# Patient Record
Sex: FEMALE | Race: WHITE | Employment: FULL TIME | URBAN - METROPOLITAN AREA
[De-identification: names, ages, dates, MRNs, and addresses within clinical notes are randomized per-mention and may not be internally consistent; named-entity substitution may affect disease eponyms.]

---

## 2017-11-01 PROBLEM — Z86.32 HISTORY OF GESTATIONAL DIABETES IN PRIOR PREGNANCY, CURRENTLY PREGNANT, FIRST TRIMESTER: Status: ACTIVE | Noted: 2017-11-01

## 2017-11-01 PROBLEM — O09.291 HISTORY OF GESTATIONAL DIABETES IN PRIOR PREGNANCY, CURRENTLY PREGNANT, FIRST TRIMESTER: Status: ACTIVE | Noted: 2017-11-01

## 2017-11-29 PROBLEM — O34.219 PREVIOUS CESAREAN DELIVERY AFFECTING PREGNANCY: Status: ACTIVE | Noted: 2017-11-29

## 2017-12-21 PROBLEM — O35.2XX0 HEREDITARY DISEASE IN FAMILY POSSIBLY AFFECTING FETUS, AFFECTING MANAGEMENT OF MOTHER, ANTEPARTUM CONDITION OR COMPLICATION: Status: ACTIVE | Noted: 2017-12-21

## 2017-12-21 PROBLEM — O09.92 HRP (HIGH RISK PREGNANCY), SECOND TRIMESTER: Status: ACTIVE | Noted: 2017-12-21

## 2017-12-21 PROBLEM — O24.414 INSULIN CONTROLLED GESTATIONAL DIABETES MELLITUS IN SECOND TRIMESTER: Status: ACTIVE | Noted: 2017-12-21

## 2017-12-21 PROBLEM — O09.292 HISTORY OF GESTATIONAL DIABETES IN PRIOR PREGNANCY, CURRENTLY PREGNANT, SECOND TRIMESTER: Status: ACTIVE | Noted: 2017-11-01

## 2017-12-21 PROBLEM — O35.BXX0 ECHOGENIC FOCUS OF HEART OF FETUS AFFECTING ANTEPARTUM CARE OF MOTHER: Status: ACTIVE | Noted: 2017-12-21

## 2018-01-16 PROBLEM — O36.8390 FETAL ARRHYTHMIA AFFECTING PREGNANCY, ANTEPARTUM: Status: ACTIVE | Noted: 2018-01-16

## 2018-01-25 ENCOUNTER — HOSPITAL ENCOUNTER (EMERGENCY)
Age: 33
Discharge: HOME | End: 2018-01-25
Attending: OBSTETRICS & GYNECOLOGY | Admitting: OBSTETRICS & GYNECOLOGY
Payer: COMMERCIAL

## 2018-01-25 VITALS
DIASTOLIC BLOOD PRESSURE: 58 MMHG | HEART RATE: 101 BPM | SYSTOLIC BLOOD PRESSURE: 105 MMHG | OXYGEN SATURATION: 100 % | RESPIRATION RATE: 16 BRPM | BODY MASS INDEX: 23.55 KG/M2 | WEIGHT: 128 LBS | TEMPERATURE: 99.1 F | HEIGHT: 62 IN

## 2018-01-25 PROBLEM — R05.9 COUGH: Status: ACTIVE | Noted: 2018-01-25

## 2018-01-25 LAB
ALBUMIN SERPL-MCNC: 2.4 G/DL (ref 3.5–5)
ALBUMIN/GLOB SERPL: 0.6 {RATIO} (ref 1.2–3.5)
ALP SERPL-CCNC: 79 U/L (ref 50–136)
ALT SERPL-CCNC: 10 U/L (ref 12–65)
ANION GAP SERPL CALC-SCNC: 10 MMOL/L (ref 7–16)
AST SERPL-CCNC: 14 U/L (ref 15–37)
ATRIAL RATE: 105 BPM
BASOPHILS # BLD: 0 K/UL (ref 0–0.2)
BASOPHILS NFR BLD: 0 % (ref 0–2)
BILIRUB SERPL-MCNC: 0.3 MG/DL (ref 0.2–1.1)
BUN SERPL-MCNC: 5 MG/DL (ref 6–23)
CALCIUM SERPL-MCNC: 10.2 MG/DL (ref 8.3–10.4)
CALCULATED P AXIS, ECG09: 31 DEGREES
CALCULATED R AXIS, ECG10: 63 DEGREES
CALCULATED T AXIS, ECG11: 36 DEGREES
CHLORIDE SERPL-SCNC: 102 MMOL/L (ref 98–107)
CO2 SERPL-SCNC: 27 MMOL/L (ref 21–32)
CREAT SERPL-MCNC: 0.84 MG/DL (ref 0.6–1)
DIAGNOSIS, 93000: NORMAL
DIFFERENTIAL METHOD BLD: ABNORMAL
EOSINOPHIL # BLD: 0 K/UL (ref 0–0.8)
EOSINOPHIL NFR BLD: 0 % (ref 0.5–7.8)
ERYTHROCYTE [DISTWIDTH] IN BLOOD BY AUTOMATED COUNT: 13.7 % (ref 11.9–14.6)
GLOBULIN SER CALC-MCNC: 3.8 G/DL (ref 2.3–3.5)
GLUCOSE BLD STRIP.AUTO-MCNC: 77 MG/DL (ref 65–100)
GLUCOSE SERPL-MCNC: 96 MG/DL (ref 65–100)
HCT VFR BLD AUTO: 31.1 % (ref 35.8–46.3)
HGB BLD-MCNC: 9.8 G/DL (ref 11.7–15.4)
IMM GRANULOCYTES # BLD: 0 K/UL (ref 0–0.5)
IMM GRANULOCYTES NFR BLD AUTO: 1 % (ref 0–5)
LYMPHOCYTES # BLD: 0.6 K/UL (ref 0.5–4.6)
LYMPHOCYTES NFR BLD: 10 % (ref 13–44)
MCH RBC QN AUTO: 28.3 PG (ref 26.1–32.9)
MCHC RBC AUTO-ENTMCNC: 31.5 G/DL (ref 31.4–35)
MCV RBC AUTO: 89.9 FL (ref 79.6–97.8)
MONOCYTES # BLD: 0.8 K/UL (ref 0.1–1.3)
MONOCYTES NFR BLD: 14 % (ref 4–12)
NEUTS SEG # BLD: 4.1 K/UL (ref 1.7–8.2)
NEUTS SEG NFR BLD: 75 % (ref 43–78)
P-R INTERVAL, ECG05: 114 MS
PLATELET # BLD AUTO: 135 K/UL (ref 150–450)
PMV BLD AUTO: 9.3 FL (ref 10.8–14.1)
POTASSIUM SERPL-SCNC: 3.4 MMOL/L (ref 3.5–5.1)
PROT SERPL-MCNC: 6.2 G/DL (ref 6.3–8.2)
Q-T INTERVAL, ECG07: 324 MS
QRS DURATION, ECG06: 84 MS
QTC CALCULATION (BEZET), ECG08: 428 MS
RBC # BLD AUTO: 3.46 M/UL (ref 4.05–5.25)
SODIUM SERPL-SCNC: 139 MMOL/L (ref 136–145)
VENTRICULAR RATE, ECG03: 105 BPM
WBC # BLD AUTO: 5.5 K/UL (ref 4.3–11.1)

## 2018-01-25 PROCEDURE — 74011250636 HC RX REV CODE- 250/636: Performed by: OBSTETRICS & GYNECOLOGY

## 2018-01-25 PROCEDURE — 93005 ELECTROCARDIOGRAM TRACING: CPT | Performed by: OBSTETRICS & GYNECOLOGY

## 2018-01-25 PROCEDURE — 85025 COMPLETE CBC W/AUTO DIFF WBC: CPT | Performed by: OBSTETRICS & GYNECOLOGY

## 2018-01-25 PROCEDURE — 99285 EMERGENCY DEPT VISIT HI MDM: CPT | Performed by: OBSTETRICS & GYNECOLOGY

## 2018-01-25 PROCEDURE — 96360 HYDRATION IV INFUSION INIT: CPT

## 2018-01-25 PROCEDURE — 80053 COMPREHEN METABOLIC PANEL: CPT | Performed by: OBSTETRICS & GYNECOLOGY

## 2018-01-25 PROCEDURE — 96360 HYDRATION IV INFUSION INIT: CPT | Performed by: OBSTETRICS & GYNECOLOGY

## 2018-01-25 PROCEDURE — 82962 GLUCOSE BLOOD TEST: CPT

## 2018-01-25 PROCEDURE — 99285 EMERGENCY DEPT VISIT HI MDM: CPT

## 2018-01-25 PROCEDURE — 74011250637 HC RX REV CODE- 250/637: Performed by: OBSTETRICS & GYNECOLOGY

## 2018-01-25 RX ORDER — ONDANSETRON 8 MG/1
8 TABLET, ORALLY DISINTEGRATING ORAL
Qty: 12 TAB | Refills: 1 | Status: SHIPPED | OUTPATIENT
Start: 2018-01-25 | End: 2018-02-06

## 2018-01-25 RX ORDER — BENZONATATE 100 MG/1
100 CAPSULE ORAL
Qty: 15 CAP | Refills: 0 | Status: SHIPPED | OUTPATIENT
Start: 2018-01-25 | End: 2018-02-01

## 2018-01-25 RX ORDER — BENZONATATE 100 MG/1
100 CAPSULE ORAL
Status: COMPLETED | OUTPATIENT
Start: 2018-01-25 | End: 2018-01-25

## 2018-01-25 RX ORDER — ONDANSETRON 4 MG/1
8 TABLET, ORALLY DISINTEGRATING ORAL
Status: COMPLETED | OUTPATIENT
Start: 2018-01-25 | End: 2018-01-25

## 2018-01-25 RX ORDER — FAMOTIDINE 20 MG/1
20 TABLET, FILM COATED ORAL
Status: COMPLETED | OUTPATIENT
Start: 2018-01-25 | End: 2018-01-25

## 2018-01-25 RX ORDER — OSELTAMIVIR PHOSPHATE 75 MG/1
75 CAPSULE ORAL
COMMUNITY
End: 2018-02-06 | Stop reason: ALTCHOICE

## 2018-01-25 RX ADMIN — SODIUM CHLORIDE, SODIUM LACTATE, POTASSIUM CHLORIDE, AND CALCIUM CHLORIDE 1000 ML: 600; 310; 30; 20 INJECTION, SOLUTION INTRAVENOUS at 14:45

## 2018-01-25 RX ADMIN — FAMOTIDINE 20 MG: 20 TABLET ORAL at 14:25

## 2018-01-25 RX ADMIN — ONDANSETRON 8 MG: 4 TABLET, ORALLY DISINTEGRATING ORAL at 14:25

## 2018-01-25 RX ADMIN — BENZONATATE 100 MG: 100 CAPSULE ORAL at 16:46

## 2018-01-25 NOTE — DISCHARGE INSTRUCTIONS
Pregnancy Precautions: Care Instructions  Your Care Instructions    There is no sure way to prevent labor before your due date ( labor) or to prevent most other pregnancy problems. But there are things you can do to increase your chances of a healthy pregnancy. Go to your appointments, follow your doctor's advice, and take good care of yourself. Eat well, and exercise (if your doctor agrees). And make sure to drink plenty of water. Follow-up care is a key part of your treatment and safety. Be sure to make and go to all appointments, and call your doctor if you are having problems. It's also a good idea to know your test results and keep a list of the medicines you take. How can you care for yourself at home? · Make sure you go to your prenatal appointments. At each visit, your doctor will check your blood pressure. Your doctor will also check to see if you have protein in your urine. High blood pressure and protein in urine are signs of preeclampsia. This condition can be dangerous for you and your baby. · Drink plenty of fluids, enough so that your urine is light yellow or clear like water. Dehydration can cause contractions. If you have kidney, heart, or liver disease and have to limit fluids, talk with your doctor before you increase the amount of fluids you drink. · Tell your doctor right away if you notice any symptoms of an infection, such as:  ¨ Burning when you urinate. ¨ A foul-smelling discharge from your vagina. ¨ Vaginal itching. ¨ Unexplained fever. ¨ Unusual pain or soreness in your uterus or lower belly. · Eat a balanced diet. Include plenty of foods that are high in calcium and iron. ¨ Foods high in calcium include milk, cheese, yogurt, almonds, and broccoli. ¨ Foods high in iron include red meat, shellfish, poultry, eggs, beans, raisins, whole-grain bread, and leafy green vegetables. · Do not smoke.  If you need help quitting, talk to your doctor about stop-smoking programs and medicines. These can increase your chances of quitting for good. · Do not drink alcohol or use illegal drugs. · Follow your doctor's directions about activity. Your doctor will let you know how much, if any, exercise you can do. · Ask your doctor if you can have sex. If you are at risk for early labor, your doctor may ask you to not have sex. · Take care to prevent falls. During pregnancy, your joints are loose, and your balance is off. Sports such as bicycling, skiing, or in-line skating can increase your risk of falling. And don't ride horses or motorcycles, dive, water ski, scuba dive, or parachute jump while you are pregnant. · Avoid getting very hot. Do not use saunas or hot tubs. Avoid staying out in the sun in hot weather for long periods. Take acetaminophen (Tylenol) to lower a high fever. · Do not take any over-the-counter or herbal medicines or supplements without talking to your doctor or pharmacist first.  When should you call for help? Call 911 anytime you think you may need emergency care. For example, call if:  ? · You passed out (lost consciousness). ? · You have severe vaginal bleeding. ? · You have severe pain in your belly or pelvis. ? · You have had fluid gushing or leaking from your vagina and you know or think the umbilical cord is bulging into your vagina. If this happens, immediately get down on your knees so your rear end (buttocks) is higher than your head. This will decrease the pressure on the cord until help arrives. ?Call your doctor now or seek immediate medical care if:  ? · You have signs of preeclampsia, such as:  ¨ Sudden swelling of your face, hands, or feet. ¨ New vision problems (such as dimness or blurring). ¨ A severe headache. ? · You have any vaginal bleeding. ? · You have belly pain or cramping. ? · You have a fever. ? · You have had regular contractions (with or without pain) for an hour.  This means that you have 8 or more within 1 hour or 4 or more in 20 minutes after you change your position and drink fluids. ? · You have a sudden release of fluid from your vagina. ? · You have low back pain or pelvic pressure that does not go away. ? · You notice that your baby has stopped moving or is moving much less than normal.   ? Watch closely for changes in your health, and be sure to contact your doctor if you have any problems. Where can you learn more? Go to http://alberto-dasha.info/. Enter 8937-1933990 in the search box to learn more about \"Pregnancy Precautions: Care Instructions. \"  Current as of: March 16, 2017  Content Version: 11.4  © 4118-4313 [employer]. Care instructions adapted under license by [employer] (which disclaims liability or warranty for this information). If you have questions about a medical condition or this instruction, always ask your healthcare professional. [address] any warranty or liability for your use of this information.

## 2018-01-25 NOTE — PROGRESS NOTES
Pt presented with c/o having the flu and now having contractions and back pain. Pt states she feels awful like she is going to pass out . Pt states she has been taking her medication since Monday and symptoms are worsening. EFM applied after explanation and verbal consent obtained.

## 2018-01-25 NOTE — IP AVS SNAPSHOT
[address] 
[phone] Patient: Kristi Saldivar 
MRN: [id number] About your hospitalization You were admitted on:  N/A You last received care in the:  [facility] You were discharged on:  January 25, 2018 Why you were hospitalized Your primary diagnosis was:  Not on File Your diagnoses also included:  Cough Follow-up Information None Your Scheduled Appointments Wednesday January 31, 2018 11:15 AM EST Return OB with Tito Griffin MD  
[facility] ([facility]) [address] [place] [id number]  
[phone] Tuesday February 06, 2018  8:00 AM EST  
GROWTH with [facility] ULTRASOUND 2  
[address] ([address] [place] [address]) [address] [place] [id number]  
[phone] Tuesday February 06, 2018  9:00 AM EST DIABETIC RECHECK with [facility] DIABETIC ED [address] ([address] [place] [address] Street) [address] [id number]  
[phone] Tuesday February 06, 2018  9:15 AM EST  
OB VISIT with Dayton Aly MD  
[address] ([address] [place] [address] Street) [address] [place] [id number]  
[phone] Discharge Orders None A check ousmane indicates which time of day the medication should be taken. My Medications ASK your doctor about these medications Instructions Each Dose to Equal  
 Morning Noon Evening Bedtime  
 aspirin 81 mg chewable tablet Your last dose was: Your next dose is: Take 81 mg by mouth daily. 81 mg  
    
   
   
   
  
 calcium carbonate 200 mg calcium (500 mg) Chew Commonly known as:  TUMS Your last dose was: Your next dose is: Take 1 Tab by mouth daily. 1 Tab * insulin detemir 100 unit/mL (3 mL) Inpn Commonly known as:  Rupal Leopard Your last dose was: Your next dose is:    
   
   
 6-25 units as directed twice daily; May substitute Lantus * insulin detemir 100 unit/mL (3 mL) Inpn Commonly known as:  Rupal Leopard Your last dose was: Your next dose is:    
   
   
 6-25 units as directed twice daily; May substitute Lantus * Insulin Needles (Disposable) 32 gauge x 5/32\" Ndle Commonly known as:  Carin Pierce Your last dose was: Your next dose is:    
   
   
 1 Pen Needle by Does Not Apply route five (5) times daily. 1 Pen Needle * Insulin Needles (Disposable) 32 gauge x 5/32\" Ndle Commonly known as:  Carin Pierce Your last dose was: Your next dose is:    
   
   
 1 Pen Needle by Does Not Apply route five (5) times daily. 1 Pen Needle PRENATAL GUMMY PO Your last dose was: Your next dose is: Take  by mouth.  
     
   
   
   
  
 promethazine 25 mg tablet Commonly known as:  PHENERGAN Your last dose was: Your next dose is: Take 1 Tab by mouth every six (6) hours as needed for Nausea. 25 mg  
    
   
   
   
  
 TAMIFLU 75 mg capsule Generic drug:  oseltamivir Your last dose was: Your next dose is: Take 75 mg by mouth. 75 mg  
    
   
   
   
  
 TYLENOL EXTRA STRENGTH 500 mg tablet Generic drug:  acetaminophen Your last dose was: Your next dose is: Take  by mouth every six (6) hours as needed for Pain. * Notice: This list has 4 medication(s) that are the same as other medications prescribed for you. Read the directions carefully, and ask your doctor or other care provider to review them with you. Discharge Instructions Pregnancy Precautions: Care Instructions Your Care Instructions There is no sure way to prevent labor before your due date ( labor) or to prevent most other pregnancy problems. But there are things you can do to increase your chances of a healthy pregnancy. Go to your appointments, follow your doctor's advice, and take good care of yourself. Eat well, and exercise (if your doctor agrees). And make sure to drink plenty of water. Follow-up care is a key part of your treatment and safety. Be sure to make and go to all appointments, and call your doctor if you are having problems. It's also a good idea to know your test results and keep a list of the medicines you take. How can you care for yourself at home? · Make sure you go to your prenatal appointments. At each visit, your doctor will check your blood pressure. Your doctor will also check to see if you have protein in your urine. High blood pressure and protein in urine are signs of preeclampsia. This condition can be dangerous for you and your baby. · Drink plenty of fluids, enough so that your urine is light yellow or clear like water. Dehydration can cause contractions. If you have kidney, heart, or liver disease and have to limit fluids, talk with your doctor before you increase the amount of fluids you drink. · Tell your doctor right away if you notice any symptoms of an infection, such as: ¨ Burning when you urinate. ¨ A foul-smelling discharge from your vagina. ¨ Vaginal itching. ¨ Unexplained fever. ¨ Unusual pain or soreness in your uterus or lower belly. · Eat a balanced diet. Include plenty of foods that are high in calcium and iron. ¨ Foods high in calcium include milk, cheese, yogurt, almonds, and broccoli. ¨ Foods high in iron include red meat, shellfish, poultry, eggs, beans, raisins, whole-grain bread, and leafy green vegetables. · Do not smoke.  If you need help quitting, talk to your doctor about stop-smoking programs and medicines. These can increase your chances of quitting for good. · Do not drink alcohol or use illegal drugs. · Follow your doctor's directions about activity. Your doctor will let you know how much, if any, exercise you can do. · Ask your doctor if you can have sex. If you are at risk for early labor, your doctor may ask you to not have sex. · Take care to prevent falls. During pregnancy, your joints are loose, and your balance is off. Sports such as bicycling, skiing, or in-line skating can increase your risk of falling. And don't ride horses or motorcycles, dive, water ski, scuba dive, or parachute jump while you are pregnant. · Avoid getting very hot. Do not use saunas or hot tubs. Avoid staying out in the sun in hot weather for long periods. Take acetaminophen (Tylenol) to lower a high fever. · Do not take any over-the-counter or herbal medicines or supplements without talking to your doctor or pharmacist first. 
When should you call for help? Call 911 anytime you think you may need emergency care. For example, call if: 
? · You passed out (lost consciousness). ? · You have severe vaginal bleeding. ? · You have severe pain in your belly or pelvis. ? · You have had fluid gushing or leaking from your vagina and you know or think the umbilical cord is bulging into your vagina. If this happens, immediately get down on your knees so your rear end (buttocks) is higher than your head. This will decrease the pressure on the cord until help arrives. ?Call your doctor now or seek immediate medical care if: 
? · You have signs of preeclampsia, such as: 
¨ Sudden swelling of your face, hands, or feet. ¨ New vision problems (such as dimness or blurring). ¨ A severe headache. ? · You have any vaginal bleeding. ? · You have belly pain or cramping. ? · You have a fever. ? · You have had regular contractions (with or without pain) for an hour. This means that you have 8 or more within 1 hour or 4 or more in 20 minutes after you change your position and drink fluids. ? · You have a sudden release of fluid from your vagina. ? · You have low back pain or pelvic pressure that does not go away. ? · You notice that your baby has stopped moving or is moving much less than normal. ? Watch closely for changes in your health, and be sure to contact your doctor if you have any problems. Where can you learn more? Go to http://alberto-dasha.info/. Enter [id number] in the search box to learn more about \"Pregnancy Precautions: Care Instructions. \" Current as of: March 16, 2017 Content Version: 11.4 © 0126-3957 [employer]. Care instructions adapted under license by [employer] (which disclaims liability or warranty for this information). If you have questions about a medical condition or this instruction, always ask your healthcare professional. [address] any warranty or liability for your use of this information. Introducing [facility]! Dear Sandra Mo: Thank you for requesting a [employer] account. Our records indicate that you already have an active [employer] account. You can access your account anytime at [email or web address] Did you know that you can access your hospital and ER discharge instructions at any time in [employer]? You can also review all of your test results from your hospital stay or ER visit. Additional Information If you have questions, please visit the Frequently Asked Questions section of the [employer] website at [email or web address] Remember, [employer] is NOT to be used for urgent needs. For medical emergencies, dial 911. Now available from your iPhone and Android! Providers Seen During Your Hospitalization Provider Specialty Primary office phone Keara King MD Obstetrics & Gynecology [phone] Your Primary Care Physician (PCP) Primary Care Physician Office Phone Office Fax Urvashi Edmnod [phone] You are allergic to the following Allergen Reactions Latex Hives Adhesive Tape-Silicones Rash Recent Documentation Height Weight BMI OB Status Smoking Status 1.575 m 58.1 kg 23.41 kg/m2 Pregnant Never Smoker Emergency Contacts Name Discharge Info Relation Home Work Mobile Rgoelio Young  Spouse [3] [phone] Patient Belongings The following personal items are in your possession at time of discharge: 
                             
 
  
  
 Please provide this summary of care documentation to your next provider. Signatures-by signing, you are acknowledging that this After Visit Summary has been reviewed with you and you have received a copy. Patient Signature:  ____________________________________________________________ Date:  ____________________________________________________________  
  
Charla Cates Provider Signature:  ____________________________________________________________ Date:  ____________________________________________________________

## 2018-01-25 NOTE — H&P
Chief Complaint   Patient presents with    Contractions     23w6d       34 y.o. female at 23w6d  weeks gestation who is seen for flu symptoms. Has tested positive and was started on tamiflu Monday. Feels her sx are getting worse: specifically nausea, with some vomiting today, contractions 2x/hour, and episodic heart racing. Is followed for gest dm on insulin dxed in early pregnancy with screening because last pregnancy was gest dm. Fetal movement has been normal.    HISTORY:  OB History    Para Term  AB Living   10 1 1 0 8 1   SAB TAB Ectopic Molar Multiple Live Births   7 0 1 0 0 1      # Outcome Date GA Lbr Rashaun/2nd Weight Sex Delivery Anes PTL Lv   10 Current            9 Term 16 38w3d  3.265 kg F CS-LTranv SPINAL AN N BRIANA      Complications: Fetal Intolerance      Birth Comments: GDM (insulin)   8 Ectopic            7 SAB            6 SAB            5 SAB            4 SAB            3 SAB            2 SAB            1 SAB               Obstetric Comments   All SAB before 12 weeks. No D&C required with any. History   Sexual Activity    Sexual activity: Yes    Partners: Male    Birth control/ protection: None     Patient's last menstrual period was 2017. Social History     Social History    Marital status:      Spouse name: N/A    Number of children: N/A    Years of education: N/A     Occupational History    Not on file.      Social History Main Topics    Smoking status: Never Smoker    Smokeless tobacco: Never Used    Alcohol use No    Drug use: No    Sexual activity: Yes     Partners: Male     Birth control/ protection: None     Other Topics Concern    Not on file     Social History Narrative       Past Surgical History:   Procedure Laterality Date     DELIVERY ONLY      HX BREAST LUMPECTOMY Right     x3 benign    HX GYN      Right fallopian tube removed with ectopic pregnancy    HX WISDOM TEETH EXTRACTION      LAP,TUBAL CAUTERY Past Medical History:   Diagnosis Date    Breast disorder     pt has 3 breast lumps on right side - one is tagged - PCP manages    Cholelithiasis with cholecystitis 12/13/2013    Diabetes ([facility] [address]     gest    Elevated prolactin level ([facility] [address] 4/3/2015    Gallbladder attack     Gestational diabetes     History of bladder infections     Hx of ectopic pregnancy 2009    Infertility, female     Oligomenorrhea     Other ill-defined conditions(799.89)     hx of a ectopic pregnancy 2-9-09    Ovarian cyst     PCOS (polycystic ovarian syndrome)     Previous recurrent miscarriages affecting pregnancy, antepartum          ROS:  Negative:   negative 10 point ROS except as noted in HPI    Positive:   per hpi    PHYSICAL EXAM:  Blood pressure 105/58, pulse (!) 101, temperature 99.1 °F (37.3 °C), resp. rate 16, height 5' 2\" (1.575 m), weight 58.1 kg (128 lb), last menstrual period 08/01/2017, SpO2 100 %, not currently breastfeeding. The patient appears relatively well, alert, oriented x 3. Appropriate affect. Lungs coarse breath sounds bilaterally, no focal findings   Heart RRR at the time of exam, no murmurs. Abdomen soft, non-tender, no rebound/guarding. Fundus soft and non tender  Skin warm, dry, no rashes  Ext no edema, DTR's normal    Cervix: deferred    Fetal Heart Rate: present      Assessment:  34 y.o. female at 24w9d with flu  Nausea likely due to tamiflu. Sounds like inadequate symptom control rather than worsening respiratory sx. Plan:  Ivf, labs, cough/nausea/reflux meds. ekg. Beth Curtis MD      Addendum:  Lab Results   Component Value Date/Time    WBC 5.5 01/25/2018 02:38 PM    HGB 9.8 01/25/2018 02:38 PM    HCT 31.1 01/25/2018 02:38 PM    PLATELET 883 88/17/1142 02:38 PM    MCV 89.9 01/25/2018 02:38 PM    Hgb, External 12.5 09/27/2017    Hct, External 37.1 09/27/2017    Platelet cnt., External 224 09/27/2017     Lab Results   Component Value Date/Time    Sodium 139 01/25/2018 02:38 PM    Potassium 3.4 01/25/2018 02:38 PM    Chloride 102 01/25/2018 02:38 PM    CO2 27 01/25/2018 02:38 PM    Anion gap 10 01/25/2018 02:38 PM    Glucose 96 01/25/2018 02:38 PM    Glucose 81 12/08/2016 09:24 AM    BUN 5 01/25/2018 02:38 PM    Creatinine 0.84 01/25/2018 02:38 PM    GFR est AA >60 01/25/2018 02:38 PM    GFR est non-AA >60 01/25/2018 02:38 PM    Calcium 10.2 01/25/2018 02:38 PM    Bilirubin, total 0.3 01/25/2018 02:38 PM    AST (SGOT) 14 01/25/2018 02:38 PM    Alk. phosphatase 79 01/25/2018 02:38 PM    Protein, total 6.2 01/25/2018 02:38 PM    Albumin 2.4 01/25/2018 02:38 PM    Globulin 3.8 01/25/2018 02:38 PM    A-G Ratio 0.6 01/25/2018 02:38 PM    ALT (SGPT) 10 01/25/2018 02:38 PM     ekg reviewed by NP/hospitalist---no concerning abnormalities    Patient d/c with warning signs, script for tessalon/zofran. otc pepcid. Beth Curtis MD

## 2018-02-06 PROBLEM — R05.9 COUGH: Status: RESOLVED | Noted: 2018-01-25 | Resolved: 2018-02-06

## 2018-02-22 PROBLEM — O40.2XX0 POLYHYDRAMNIOS IN SECOND TRIMESTER: Status: ACTIVE | Noted: 2018-02-22

## 2018-03-06 PROBLEM — O09.93 HIGH-RISK PREGNANCY IN THIRD TRIMESTER: Status: ACTIVE | Noted: 2017-12-21

## 2018-03-06 PROBLEM — O09.293 HISTORY OF GESTATIONAL DIABETES IN PRIOR PREGNANCY, CURRENTLY PREGNANT, THIRD TRIMESTER: Status: ACTIVE | Noted: 2017-11-01

## 2018-03-06 PROBLEM — O40.3XX0 POLYHYDRAMNIOS IN THIRD TRIMESTER: Status: ACTIVE | Noted: 2018-02-22

## 2018-03-06 PROBLEM — O36.63X0 MATERNAL CARE FOR EXCESSIVE FETAL GROWTH IN THIRD TRIMESTER: Status: ACTIVE | Noted: 2018-03-06

## 2018-03-12 PROBLEM — O99.013 ANEMIA IN PREGNANCY, THIRD TRIMESTER: Status: ACTIVE | Noted: 2018-03-12

## 2018-03-15 ENCOUNTER — HOSPITAL ENCOUNTER (OUTPATIENT)
Age: 33
Setting detail: OBSERVATION
Discharge: HOME | DRG: 781 | End: 2018-03-15
Attending: OBSTETRICS & GYNECOLOGY | Admitting: OBSTETRICS & GYNECOLOGY
Payer: COMMERCIAL

## 2018-03-15 VITALS
TEMPERATURE: 99.4 F | DIASTOLIC BLOOD PRESSURE: 60 MMHG | WEIGHT: 146 LBS | SYSTOLIC BLOOD PRESSURE: 104 MMHG | HEART RATE: 95 BPM | HEIGHT: 62 IN | BODY MASS INDEX: 26.87 KG/M2 | RESPIRATION RATE: 18 BRPM

## 2018-03-15 DIAGNOSIS — O24.414 INSULIN CONTROLLED GESTATIONAL DIABETES MELLITUS (GDM) DURING PREGNANCY, ANTEPARTUM: ICD-10-CM

## 2018-03-15 DIAGNOSIS — O24.414 INSULIN CONTROLLED GESTATIONAL DIABETES MELLITUS IN THIRD TRIMESTER: ICD-10-CM

## 2018-03-15 PROBLEM — O24.919 DIABETES IN UNDELIVERED PREGNANCY: Status: ACTIVE | Noted: 2018-03-15

## 2018-03-15 PROBLEM — O24.913 DIABETES MELLITUS COMPLICATING PREGNANCY, ANTEPARTUM, THIRD TRIMESTER: Status: ACTIVE | Noted: 2018-03-15

## 2018-03-15 LAB
GLUCOSE BLD STRIP.AUTO-MCNC: 89 MG/DL (ref 65–100)
GLUCOSE BLD STRIP.AUTO-MCNC: 97 MG/DL (ref 65–100)

## 2018-03-15 PROCEDURE — 82962 GLUCOSE BLOOD TEST: CPT

## 2018-03-15 PROCEDURE — 99226 PR SBSQ OBSERVATION CARE/DAY 35 MINUTES: CPT | Performed by: OBSTETRICS & GYNECOLOGY

## 2018-03-15 PROCEDURE — 99284 EMERGENCY DEPT VISIT MOD MDM: CPT

## 2018-03-15 PROCEDURE — 59025 FETAL NON-STRESS TEST: CPT

## 2018-03-15 PROCEDURE — 65270000029 HC RM PRIVATE

## 2018-03-15 PROCEDURE — 99218 HC RM OBSERVATION: CPT

## 2018-03-15 NOTE — PROGRESS NOTES
Results for Inga Arroyo (MRN [id number]) as of 3/15/2018 15:25   Ref.  Range 3/15/2018 13:50 3/15/2018 14:40   GLUCOSE,FAST - POC Latest Ref Range: 65 - 100 mg/dL 89 97

## 2018-03-15 NOTE — CONSULTS
MFM Antepartum Progress Note    Patient admitted for low BS in OB office. Patient denies HA, abdominal pain, vision changes. Has eaten 2 Hot dogs feels good, Has noted over the last few weeks. Vitals: Temp (24hrs), Av.4 °F (37.4 °C), Min:99.4 °F (37.4 °C), Max:99.4 °F (37.4 °C)     Patient Vitals for the past 24 hrs:   BP   03/15/18 1354 104/60         Exam:  Patient without distress. Abdomen soft, non-tender               Fundus soft and non tender               Fundal height 36 cm               Right upper quadrant non-tender               Lower extremities edema No                          NST:  reactive           Labs:  CBC:    Recent Labs      18   1438  17   1355 17   WBC  5.5  5.9   --    HGB  9.8*  12.5   --    HCT  31.1*  37.1   --    PLT  135*  224   --    HGBEXT   --    --   12.5   HCTEXT   --    --   37.1   PLTEXT   --    --   224       CMP:   Recent Labs      18   1438   NA  139   K  3.4*   CL  102   CO2  27   AGAP  10   GLU  96   BUN  5*   CREA  0.84   GFRAA  >60   GFRNA  >60   CA  10.2   ALB  2.4*   TP  6.2*   GLOB  3.8*   AGRAT  0.6*   SGOT  14*   ALT  10*       No results for input(s): URICA, LDH, PUQ in the last 7224 hours. COAGS:  No results for input(s): APTT, PTP, INR in the last 7224 hours.     Invalid input(s): PTTP, INRT, INREXT, INREXT    Recent Glucose Results:   Recent Labs      03/15/18   1440  03/15/18   1350  18   1522  18   1438   GLU   --    --    --   96   GLUCPOC  97  89  77   --          Prenatal Labs:    Lab Results   Component Value Date/Time    Rubella, External immune 2017    GrBStrep, External Positive urine 02/15/2016    HBsAg, External neg 2017    HIV, External NR  2017    RPR, External NR  2017       Recommendations  Patient Active Problem List                 Insulin controlled gestational diabetes mellitus in third trimester        3/15/2018-[facility]: Patient having frequent low BS over last week, very low today, sent to hospital for evaluation. Doing well now after eating lunch. Will try to decreasing insulin. Current Dose is now Levemir 24/14, Humalog 8/12/14, and go up 2 units each night if fastings not <95. RECOMMENDATIONS:    · Continue QID BG testing and send logs weekly to OB and MFM office. · Decrease Levemir and Humalog to above dose; adjust prn for elevations. · Repeat US for growth and diabetes check in 2 weeks at [facility]. · Plan repeat C/S at 38 to 39 weeks at this time.  Polyhydramnios in third trimester     3/6/2018 at [facility]:  NAEL 31.8 cm (DVP 10.7). · Note given not to travel out of town. · Repeat US at [facility] in 10 days for growth and BPP. · PTL/Pain warnings given.                                                                         Time: 37    Minutes spent on floor,with greater than 50% of the time examining patient, explaining plan and coordinating care with nurse and requesting primary physician.

## 2018-03-15 NOTE — IP AVS SNAPSHOT
[address] [facility] [address] [facility] 
[phone] Patient: Fannie Rogers 
MRN: [id number] MGK:7/43/2022 About your hospitalization You were admitted on:  March 15, 2018 You last received care in the:  E 4 ANTEPARTUM You were discharged on:  March 15, 2018 Why you were hospitalized Your primary diagnosis was:  Not on File Your diagnoses also included:  Diabetes In Undelivered Pregnancy, Insulin Controlled Gestational Diabetes Mellitus In Third Trimester, Diabetes Mellitus Complicating Pregnancy, Antepartum, Third Trimester Follow-up Information None Your Scheduled Appointments Tuesday March 20, 2018  9:00 AM EDT Return OB with [address] [facility] ([facility]) [address] [address]  
[phone] Tuesday March 20, 2018  9:30 AM EDT Return OB with Gi Monsalve MD  
[facility] ([facility]) [address] [address] [phone] Friday March 23, 2018  8:30 AM EDT Ultrasound plus physician visit with Bekah Mireles ([facility]) [address] [id number] [phone] Friday March 23, 2018  9:15 AM EDT [facility]trasound [facility] [address] [id number] [phone] Tuesday March 27, 2018  8:00 AM EDT  
GROWTH BPP DOPPLER with [facility] ULTRASOUND 1  
[facility] MATERNAL FETAL MEDICINE ([facility] [address]) [address] [address]  
[phone] Tuesday March 27, 2018  9:00 AM EDT  
DIABETIC RECHECK with [facility] DIABETIC ED [address] ([address]) [address] [id number] [phone] Tuesday March 27, 2018  9:30 AM EDT  
OB VISIT with Forest Cheng MD  
[address] ([address]) [address] [place] [id number] [phone] Discharge Orders None A check ousmane indicates which time of day the medication should be taken. My Medications ASK your doctor about these medications Instructions Each Dose to Equal  
 Morning Noon Evening Bedtime  
 aspirin 81 mg chewable tablet Your last dose was: Your next dose is: Take 81 mg by mouth daily. 81 mg  
    
   
   
   
  
 calcium carbonate 200 mg calcium (500 mg) Chew Commonly known as:  TUMS Your last dose was: Your next dose is: Take 1 Tab by mouth daily. 1 Tab * insulin detemir U-100 100 unit/mL (3 mL) Inpn Commonly known as:  LEVEMIR FLEXTOUCH U-100 INSULN Your last dose was: Your next dose is:    
   
   
 6-25 units as directed twice daily; May substitute Lantus * insulin detemir U-100 100 unit/mL (3 mL) Inpn Commonly known as:  LEVEMIR FLEXTOUCH U-100 INSULN Your last dose was: Your next dose is:    
   
   
 6-25 units as directed twice daily; May substitute Lantus  
     
   
   
   
  
 insulin lispro 100 unit/mL kwikpen Commonly known as:  HUMALOG Your last dose was: Your next dose is:    
   
   
 6 to 25 units with meals as directed. * Insulin Needles (Disposable) 32 gauge x 5/32\" Ndle Commonly known as:  Denzel Dupree Your last dose was: Your next dose is:    
   
   
 1 Pen Needle by Does Not Apply route five (5) times daily. 1 Pen Needle * Insulin Needles (Disposable) 32 gauge x 5/32\" Ndle Commonly known as:  Denzel Dupree Your last dose was: Your next dose is: 1 Pen Needle by Does Not Apply route five (5) times daily. 1 Pen Needle PEPCID AC 10 mg tablet Generic drug:  famotidine Your last dose was: Your next dose is: Take 10 mg by mouth two (2) times a day. 10 mg PRENATAL GUMMY PO Your last dose was: Your next dose is: Take  by mouth. TYLENOL EXTRA STRENGTH 500 mg tablet Generic drug:  acetaminophen Your last dose was: Your next dose is: Take  by mouth every six (6) hours as needed for Pain. * Notice: This list has 4 medication(s) that are the same as other medications prescribed for you. Read the directions carefully, and ask your doctor or other care provider to review them with you. Discharge Instructions Gestational Diabetes: Care Instructions Your Care Instructions Gestational diabetes can develop during pregnancy. When you have this condition, the insulin in your body is not able to keep your blood sugar in a normal range. If you do not control your blood sugar, your baby can grow too big and have problems right after birth such as low blood sugar. Most of the time, gestational diabetes goes away after a baby is born. But if you have had gestational diabetes, you have a greater chance of having it in a future pregnancy and of developing type 2 diabetes. To check for diabetes, you may have a follow-up glucose tolerance test 4 to 12 weeks after your baby is born or after you stop breastfeeding your baby. If the results of this test are normal, experts recommend that you get tested for type 2 diabetes at least every 3 years. You may be able to control your blood sugar with a healthy diet and regular exercise. Staying at a healthy weight also may keep you from getting type 2 diabetes later on.  If diet and exercise do not lower your blood sugar enough, you may need to take diabetes medicine or insulin. Follow-up care is a key part of your treatment and safety. Be sure to make and go to all appointments, and call your doctor if you are having problems. It's also a good idea to know your test results and keep a list of the medicines you take. How can you care for yourself at home? · If your doctor prescribes insulin, inject it daily as directed. Your doctor will tell you how and when to take your insulin. · Check your blood sugar as directed. Your doctor will tell you how and when to check your blood sugar. · Monitor your baby's movement as directed. Your doctor may ask you to report how many times in an hour you feel your baby move. · Eat a balanced diet. You may want to meet with a registered dietitian. He or she can teach you how to spread carbohydrates through the day. This may keep your blood sugar from going up quickly after meals. If you are taking insulin, you also can learn to match the amount of insulin you take at meals to the amount of carbohydrates you eat. · Do not diet to lose weight. It is not healthy when you are pregnant. · Get daily exercise. This can help lower your blood sugar. Walking and swimming are good choices. But do not do any exercise without talking with your doctor first. 
When should you call for help? Call 911 anytime you think you may need emergency care. For example, call if: 
? · You passed out (lost consciousness), or you suddenly become very sleepy or confused. (You may have very low blood sugar.) ? · You have symptoms of high blood sugar, such as: ¨ Blurred vision. ¨ Trouble staying awake or being woken up. ¨ Fast, deep breathing. ¨ Breath that smells fruity. ¨ Belly pain, not feeling hungry, and vomiting. ¨ Feeling confused. ?Call your doctor now or seek immediate medical care if: 
? · You are sick and cannot control your blood sugar. ? · You have been vomiting or have had diarrhea for more than 6 hours. ? · Your blood sugar stays higher than the level your doctor has set for you. ? · You have symptoms of low blood sugar, such as: ¨ Sweating. ¨ Feeling nervous, shaky, and weak. ¨ Extreme hunger and slight nausea. ¨ Dizziness and headache. ¨ Blurred vision. ¨ Confusion. ? Watch closely for changes in your health, and be sure to contact your doctor if: 
? · You have a hard time knowing when your blood sugar is low. ? · You have trouble keeping your blood sugar in the target range. ? · You often have problems controlling your blood sugar. Where can you learn more? Go to http://alberto-dasha.info/. Enter A800 in the search box to learn more about \"Gestational Diabetes: Care Instructions. \" Current as of: March 13, 2017 Content Version: 11.4 © 8159-7360 [employer]. Care instructions adapted under license by [employer] (which disclaims liability or warranty for this information). If you have questions about a medical condition or this instruction, always ask your healthcare professional. [place] [address] any warranty or liability for your use of this information. Introducing [address]! Dear Jordi Ann: Thank you for requesting a Bouf account. Our records indicate that you already have an active Bouf account. You can access your account anytime at [email or web address] Did you know that you can access your hospital and ER discharge instructions at any time in Bouf? You can also review all of your test results from your hospital stay or ER visit. Additional Information If you have questions, please visit the Frequently Asked Questions section of the Bouf website at [email or web address] Remember, Bouf is NOT to be used for urgent needs. For medical emergencies, dial 911. Now available from your iPhone and Android! Providers Seen During Your Hospitalization Provider Specialty Primary office phone Jenny Joy MD Obstetrics & Gynecology [phone] Your Primary Care Physician (PCP) Primary Care Physician Office Phone Office Fax Jerilyn German [phone] You are allergic to the following Allergen Reactions Latex Hives Adhesive Tape-Silicones Rash Recent Documentation Height Weight Breastfeeding? BMI OB Status Smoking Status 1.575 m 66.2 kg Yes 26.7 kg/m2 Pregnant Never Smoker Emergency Contacts Name Discharge Info Relation Home Work Mobile Rogelio Young  Spouse [3] [phone] Patient Belongings The following personal items are in your possession at time of discharge: 
  Dental Appliances: None  Visual Aid: None      Home Medications: None   Jewelry: None  Clothing: At bedside    Other Valuables: At bedside Please provide this summary of care documentation to your next provider. Signatures-by signing, you are acknowledging that this After Visit Summary has been reviewed with you and you have received a copy. Patient Signature:  ____________________________________________________________ Date:  ____________________________________________________________  
  
[place] Provider Signature:  ____________________________________________________________ Date:  ____________________________________________________________

## 2018-03-15 NOTE — PROGRESS NOTES
Here from the office for blood sugar regulation. Per dr thornton- pt's blood sugars have been swinging from 200's to 30's. Was 27 in the office today. Ordering gestational diet now.  Blood sugar 89 after drinking 2 apple juices in the office prior to arrival.

## 2018-03-15 NOTE — IP AVS SNAPSHOT
[address] [facility] [address]  
[phone] Patient: Fannie Rogers 
MRN: [id number] BKV:[id number] A check ousmane indicates which time of day the medication should be taken. My Medications ASK your doctor about these medications Instructions Each Dose to Equal  
 Morning Noon Evening Bedtime  
 aspirin 81 mg chewable tablet Your last dose was: Your next dose is: Take 81 mg by mouth daily. 81 mg  
    
   
   
   
  
 calcium carbonate 200 mg calcium (500 mg) Chew Commonly known as:  TUMS Your last dose was: Your next dose is: Take 1 Tab by mouth daily. 1 Tab * insulin detemir U-100 100 unit/mL (3 mL) Inpn Commonly known as:  LEVEMIR FLEXTOUCH U-100 INSULN Your last dose was: Your next dose is:    
   
   
 6-25 units as directed twice daily; May substitute Lantus * insulin detemir U-100 100 unit/mL (3 mL) Inpn Commonly known as:  LEVEMIR FLEXTOUCH U-100 INSULN Your last dose was: Your next dose is:    
   
   
 6-25 units as directed twice daily; May substitute Lantus  
     
   
   
   
  
 insulin lispro 100 unit/mL kwikpen Commonly known as:  HUMALOG Your last dose was: Your next dose is:    
   
   
 6 to 25 units with meals as directed. * Insulin Needles (Disposable) 32 gauge x 5/32\" Ndle Commonly known as:  Nikhil People Your last dose was: Your next dose is:    
   
   
 1 Pen Needle by Does Not Apply route five (5) times daily. 1 Pen Needle * Insulin Needles (Disposable) 32 gauge x 5/32\" Ndle Commonly known as:  [place] People Your last dose was: Your next dose is:    
   
   
 1 Pen Needle by Does Not Apply route five (5) times daily. 1 Pen Needle PEPCID AC 10 mg tablet Generic drug:  famotidine Your last dose was: Your next dose is: Take 10 mg by mouth two (2) times a day. 10 mg PRENATAL GUMMY PO Your last dose was: Your next dose is: Take  by mouth. TYLENOL EXTRA STRENGTH 500 mg tablet Generic drug:  acetaminophen Your last dose was: Your next dose is: Take  by mouth every six (6) hours as needed for Pain. * Notice: This list has 4 medication(s) that are the same as other medications prescribed for you. Read the directions carefully, and ask your doctor or other care provider to review them with you.

## 2018-03-15 NOTE — DISCHARGE INSTRUCTIONS
Gestational Diabetes: Care Instructions  Your Care Instructions    Gestational diabetes can develop during pregnancy. When you have this condition, the insulin in your body is not able to keep your blood sugar in a normal range. If you do not control your blood sugar, your baby can grow too big and have problems right after birth such as low blood sugar. Most of the time, gestational diabetes goes away after a baby is born. But if you have had gestational diabetes, you have a greater chance of having it in a future pregnancy and of developing type 2 diabetes. To check for diabetes, you may have a follow-up glucose tolerance test 4 to 12 weeks after your baby is born or after you stop breastfeeding your baby. If the results of this test are normal, experts recommend that you get tested for type 2 diabetes at least every 3 years. You may be able to control your blood sugar with a healthy diet and regular exercise. Staying at a healthy weight also may keep you from getting type 2 diabetes later on. If diet and exercise do not lower your blood sugar enough, you may need to take diabetes medicine or insulin. Follow-up care is a key part of your treatment and safety. Be sure to make and go to all appointments, and call your doctor if you are having problems. It's also a good idea to know your test results and keep a list of the medicines you take. How can you care for yourself at home? · If your doctor prescribes insulin, inject it daily as directed. Your doctor will tell you how and when to take your insulin. · Check your blood sugar as directed. Your doctor will tell you how and when to check your blood sugar. · Monitor your baby's movement as directed. Your doctor may ask you to report how many times in an hour you feel your baby move. · Eat a balanced diet. You may want to meet with a registered dietitian. He or she can teach you how to spread carbohydrates through the day.  This may keep your blood sugar from going up quickly after meals. If you are taking insulin, you also can learn to match the amount of insulin you take at meals to the amount of carbohydrates you eat. · Do not diet to lose weight. It is not healthy when you are pregnant. · Get daily exercise. This can help lower your blood sugar. Walking and swimming are good choices. But do not do any exercise without talking with your doctor first.  When should you call for help? Call 911 anytime you think you may need emergency care. For example, call if:  ? · You passed out (lost consciousness), or you suddenly become very sleepy or confused. (You may have very low blood sugar.)   ? · You have symptoms of high blood sugar, such as:  ¨ Blurred vision. ¨ Trouble staying awake or being woken up. ¨ Fast, deep breathing. ¨ Breath that smells fruity. ¨ Belly pain, not feeling hungry, and vomiting. ¨ Feeling confused. ?Call your doctor now or seek immediate medical care if:  ? · You are sick and cannot control your blood sugar. ? · You have been vomiting or have had diarrhea for more than 6 hours. ? · Your blood sugar stays higher than the level your doctor has set for you. ? · You have symptoms of low blood sugar, such as:  ¨ Sweating. ¨ Feeling nervous, shaky, and weak. ¨ Extreme hunger and slight nausea. ¨ Dizziness and headache. ¨ Blurred vision. ¨ Confusion. ? Watch closely for changes in your health, and be sure to contact your doctor if:  ? · You have a hard time knowing when your blood sugar is low. ? · You have trouble keeping your blood sugar in the target range. ? · You often have problems controlling your blood sugar. Where can you learn more? Go to http://alberto-dasha.info/. Enter A800 in the search box to learn more about \"Gestational Diabetes: Care Instructions. \"  Current as of: March 13, 2017  Content Version: 11.4  © 3531-7523 Healthwise, [employer]  Care instructions adapted under license by [employer] (which disclaims liability or warranty for this information). If you have questions about a medical condition or this instruction, always ask your healthcare professional. [address] any warranty or liability for your use of this information.

## 2018-03-23 PROBLEM — O24.913 DIABETES MELLITUS COMPLICATING PREGNANCY, ANTEPARTUM, THIRD TRIMESTER: Status: RESOLVED | Noted: 2018-03-15 | Resolved: 2018-03-23

## 2018-03-23 PROBLEM — O24.919 DIABETES IN UNDELIVERED PREGNANCY: Status: RESOLVED | Noted: 2018-03-15 | Resolved: 2018-03-23

## 2018-03-26 ENCOUNTER — HOSPITAL ENCOUNTER (EMERGENCY)
Age: 33
Discharge: HOME | End: 2018-03-26
Attending: OBSTETRICS & GYNECOLOGY | Admitting: OBSTETRICS & GYNECOLOGY
Payer: COMMERCIAL

## 2018-03-26 VITALS
RESPIRATION RATE: 18 BRPM | SYSTOLIC BLOOD PRESSURE: 98 MMHG | DIASTOLIC BLOOD PRESSURE: 64 MMHG | TEMPERATURE: 99.7 F | HEART RATE: 82 BPM

## 2018-03-26 PROBLEM — O36.8135 DECREASED FETAL MOVEMENT AFFECTING MANAGEMENT OF PREGNANCY IN THIRD TRIMESTER, FETUS 5: Status: ACTIVE | Noted: 2018-03-26

## 2018-03-26 PROCEDURE — 99283 EMERGENCY DEPT VISIT LOW MDM: CPT

## 2018-03-26 PROCEDURE — 76815 OB US LIMITED FETUS(S): CPT

## 2018-03-26 PROCEDURE — 59025 FETAL NON-STRESS TEST: CPT

## 2018-03-26 NOTE — IP AVS SNAPSHOT
[address] [place] [address] 
[phone] Patient: Brion Curling 
MRN: [id number]:1/39/[id number] About your hospitalization You were admitted on:  N/A You last received care in the:  SFE 4 ANTEPARTUM You were discharged on:  March 26, 2018 Why you were hospitalized Your primary diagnosis was:  Not on File Your diagnoses also included:  Decreased Fetal Movement Affecting Management Of Pregnancy In Third Trimester, Fetus 5 Follow-up Information Follow up With Details Comments Contact Info Ramin Thakkar, [address] [place] [address] 
[phone] Your Scheduled Appointments Tuesday March 27, 2018  8:00 AM EDT  
GROWTH BPP DOPPLER with [facility] ULTRASOUND 1  
[facility] MATERNAL FETAL MEDICINE ([address] [place] [address]) [address] [address]  
[phone] Tuesday March 27, 2018  9:00 AM EDT  
DIABETIC RECHECK with [facility] DIABETIC ED [address] [place] [address] ([address] [place] [address]) [address] [place] [id number]  
[phone] Tuesday March 27, 2018  9:30 AM EDT  
OB VISIT with Loreto Larsen MD  
[address] ([address] [place] [address]) [address] [address]  
[phone] Friday March 30, 2018 10:30 AM EDT Ultrasound plus physician visit with Bekah [address] ([facility]) [address] [id number] [phone] Friday March 30, 2018 11:30 AM EDT [facility] [address] [address]  
[phone] Friday April 06, 2018 11:30 AM EDT Ultrasound plus physician visit with Bridget Dill MD, [facility] ([facility]) [address] [facility] [address]  
[phone] Thursday April 12, 2018  9:30 AM EDT Ultrasound plus physician visit [facility] ([facility]) [address] [facility] [id number] [phone] Discharge Orders None A check ousmane indicates which time of day the medication should be taken. My Medications ASK your doctor about these medications Instructions Each Dose to Equal  
 Morning Noon Evening Bedtime  
 ascorbic acid (vitamin C) 500 mg tablet Commonly known as:  VITAMIN C Your last dose was: Your next dose is: Take 1 Tab by mouth three (3) times daily. 500 mg  
    
   
   
   
  
 aspirin 81 mg chewable tablet Your last dose was: Your next dose is: Take 81 mg by mouth daily. 81 mg  
    
   
   
   
  
 calcium carbonate 200 mg calcium (500 mg) Chew Commonly known as:  TUMS Your last dose was: Your next dose is: Take 1 Tab by mouth daily. 1 Tab  
    
   
   
   
  
 ferrous sulfate 325 mg (65 mg iron) tablet Commonly known as:  Iron (Ferrous Sulfate) Your last dose was: Your next dose is: Take 1 Tab by mouth three (3) times daily. 325 mg  
    
   
   
   
  
 * insulin detemir U-100 100 unit/mL (3 mL) Inpn Commonly known as:  LEVEMIR FLEXTOUCH U-100 INSULN Your last dose was: Your next dose is:    
   
   
 6-25 units as directed twice daily; May substitute Lantus * insulin detemir U-100 100 unit/mL (3 mL) Inpn Commonly known as:  LEVEMIR FLEXTOUCH U-100 INSULN Your last dose was: Your next dose is:    
   
   
 6-25 units as directed twice daily; May substitute Lantus  
     
   
   
   
  
 insulin lispro 100 unit/mL kwikpen Commonly known as:  HUMALOG Your last dose was: Your next dose is:    
   
   
 6 to 25 units with meals as directed. * Insulin Needles (Disposable) 32 gauge x 5/32\" Ndle Commonly known as:  Mine Lusher Your last dose was: Your next dose is:    
   
   
 1 Pen Needle by Does Not Apply route five (5) times daily. 1 Pen Needle * Insulin Needles (Disposable) 32 gauge x 5/32\" Ndle Commonly known as:  Mine Lusher Your last dose was: Your next dose is:    
   
   
 1 Pen Needle by Does Not Apply route five (5) times daily. 1 Pen Needle PEPCID AC 10 mg tablet Generic drug:  famotidine Your last dose was: Your next dose is: Take 10 mg by mouth two (2) times a day. 10 mg PRENATAL GUMMY PO Your last dose was: Your next dose is: Take  by mouth. TYLENOL EXTRA STRENGTH 500 mg tablet Generic drug:  acetaminophen Your last dose was: Your next dose is: Take  by mouth every six (6) hours as needed for Pain. * Notice: This list has 4 medication(s) that are the same as other medications prescribed for you. Read the directions carefully, and ask your doctor or other care provider to review them with you. Discharge Instructions Pregnancy Precautions: Care Instructions Your Care Instructions There is no sure way to prevent labor before your due date ( labor) or to prevent most other pregnancy problems. But there are things you can do to increase your chances of a healthy pregnancy. Go to your appointments, follow your doctor's advice, and take good care of yourself. Eat well, and exercise (if your doctor agrees). And make sure to drink plenty of water. Follow-up care is a key part of your treatment and safety.  Be sure to make and go to all appointments, and call your doctor if you are having problems. It's also a good idea to know your test results and keep a list of the medicines you take. How can you care for yourself at home? · Make sure you go to your prenatal appointments. At each visit, your doctor will check your blood pressure. Your doctor will also check to see if you have protein in your urine. High blood pressure and protein in urine are signs of preeclampsia. This condition can be dangerous for you and your baby. · Drink plenty of fluids, enough so that your urine is light yellow or clear like water. Dehydration can cause contractions. If you have kidney, heart, or liver disease and have to limit fluids, talk with your doctor before you increase the amount of fluids you drink. · Tell your doctor right away if you notice any symptoms of an infection, such as: ¨ Burning when you urinate. ¨ A foul-smelling discharge from your vagina. ¨ Vaginal itching. ¨ Unexplained fever. ¨ Unusual pain or soreness in your uterus or lower belly. · Eat a balanced diet. Include plenty of foods that are high in calcium and iron. ¨ Foods high in calcium include milk, cheese, yogurt, almonds, and broccoli. ¨ Foods high in iron include red meat, shellfish, poultry, eggs, beans, raisins, whole-grain bread, and leafy green vegetables. · Do not smoke. If you need help quitting, talk to your doctor about stop-smoking programs and medicines. These can increase your chances of quitting for good. · Do not drink alcohol or use illegal drugs. · Follow your doctor's directions about activity. Your doctor will let you know how much, if any, exercise you can do. · Ask your doctor if you can have sex. If you are at risk for early labor, your doctor may ask you to not have sex. · Take care to prevent falls. During pregnancy, your joints are loose, and your balance is off.  Sports such as bicycling, skiing, or in-line skating can increase your risk of falling. And don't ride horses or motorcycles, dive, water ski, scuba dive, or parachute jump while you are pregnant. · Avoid getting very hot. Do not use saunas or hot tubs. Avoid staying out in the sun in hot weather for long periods. Take acetaminophen (Tylenol) to lower a high fever. · Do not take any over-the-counter or herbal medicines or supplements without talking to your doctor or pharmacist first. 
When should you call for help? Call 911 anytime you think you may need emergency care. For example, call if: 
? · You passed out (lost consciousness). ? · You have severe vaginal bleeding. ? · You have severe pain in your belly or pelvis. ? · You have had fluid gushing or leaking from your vagina and you know or think the umbilical cord is bulging into your vagina. If this happens, immediately get down on your knees so your rear end (buttocks) is higher than your head. This will decrease the pressure on the cord until help arrives. ?Call your doctor now or seek immediate medical care if: 
? · You have signs of preeclampsia, such as: 
¨ Sudden swelling of your face, hands, or feet. ¨ New vision problems (such as dimness or blurring). ¨ A severe headache. ? · You have any vaginal bleeding. ? · You have belly pain or cramping. ? · You have a fever. ? · You have had regular contractions (with or without pain) for an hour. This means that you have 8 or more within 1 hour or 4 or more in 20 minutes after you change your position and drink fluids. ? · You have a sudden release of fluid from your vagina. ? · You have low back pain or pelvic pressure that does not go away. ? · You notice that your baby has stopped moving or is moving much less than normal. ? Watch closely for changes in your health, and be sure to contact your doctor if you have any problems. Where can you learn more? Go to http://alberto-dasha.info/. Enter 4272-5248152 in the search box to learn more about \"Pregnancy Precautions: Care Instructions. \" Current as of: March 16, 2017 Content Version: 11.4 © 4218-4789 [employer]. Care instructions adapted under license by [employer] (which disclaims liability or warranty for this information). If you have questions about a medical condition or this instruction, always ask your healthcare professional. [place] [address] any warranty or liability for your use of this information. Introducing [facility]! Dear Delaney Hughes: Thank you for requesting a [employer] account. Our records indicate that you already have an active [employer] account. You can access your account anytime at [email or web address] Did you know that you can access your hospital and ER discharge instructions at any time in [employer]ich[employer] Online? You can also review all of your test results from your hospital stay or ER visit. Additional Information If you have questions, please visit the Frequently Asked Questions section of the [employer] website at [email or web address] Remember, [employer] Online is NOT to be used for urgent needs. For medical emergencies, dial 911. Now available from your iPhone and Android! Providers Seen During Your Hospitalization Provider Specialty Primary office phone Sierra Krishna MD Obstetrics & Gynecology [phone] Your Primary Care Physician (PCP) Primary Care Physician Office Phone Office Fax Stefany Butler [phone] You are allergic to the following Allergen Reactions Latex Hives Adhesive Tape-Silicones Rash Recent Documentation OB Status Smoking Status Pregnant Never Smoker Emergency Contacts Name Discharge Info Relation Home Work Mobile Rogelio Young  Spouse [3] [phone] Patient Belongings The following personal items are in your possession at time of discharge: 
                             
 
  
  
 Please provide this summary of care documentation to your next provider. Signatures-by signing, you are acknowledging that this After Visit Summary has been reviewed with you and you have received a copy. Patient Signature:  ____________________________________________________________ Date:  ____________________________________________________________  
  
Mitch Kin Provider Signature:  ____________________________________________________________ Date:  ____________________________________________________________

## 2018-03-26 NOTE — ED PROVIDER NOTES
Chief Complaint:      28 y.o. female at 33w6d  weeks gestation who is seen for decreased FM over the last several hours. Pt with insulin requiring GDM (A2) and with polyhydramnios. Pt has had problems throughout the pregnancy feeling FM. She denies VB, LOF, preeclamptic symptoms, uterine ctx, or abdominal pain. HISTORY:    History   Sexual Activity    Sexual activity: Yes    Partners: Male    Birth control/ protection: None     Patient's last menstrual period was 2017. Social History     Social History    Marital status:      Spouse name: N/A    Number of children: N/A    Years of education: N/A     Occupational History    Not on file.      Social History Main Topics    Smoking status: Never Smoker    Smokeless tobacco: Never Used    Alcohol use No    Drug use: No    Sexual activity: Yes     Partners: Male     Birth control/ protection: None     Other Topics Concern    Not on file     Social History Narrative       Past Surgical History:   Procedure Laterality Date     DELIVERY ONLY      HX BREAST LUMPECTOMY Right     x3 benign    HX GYN  2009    Right fallopian tube removed with ectopic pregnancy    HX WISDOM TEETH EXTRACTION      LAP,TUBAL CAUTERY         Past Medical History:   Diagnosis Date    Anemia in pregnancy, third trimester 3/12/2018    Breast disorder     pt has 3 breast lumps on right side - one is tagged - PCP manages    Cholelithiasis with cholecystitis 2013    Diabetes ([address]     gest    Elevated prolactin level ([address] 4/3/2015    Gallbladder attack     Gestational diabetes     History of bladder infections     Hx of ectopic pregnancy     Infertility, female     Oligomenorrhea     Other ill-defined conditions(799.89)     hx of a ectopic pregnancy 09    Ovarian cyst     PCOS (polycystic ovarian syndrome)     Previous recurrent miscarriages affecting pregnancy, antepartum          ROS:  A 12 point review of symptoms negative except for chief complaint as described above. PHYSICAL EXAM:  Blood pressure 98/64, pulse 82, temperature 99.7 °F (37.6 °C), resp. rate 18, last menstrual period 08/01/2017, currently breastfeeding. Constitutional: The patient appears well, alert, oriented x 3. Cardiovascular: Heart RRR, no murmurs. Respiratory: Lungs clear, no respiratory distress  GI: Abdomen soft, nontender, no guarding  No fundal tenderness  Musculoskeletal: no cva tenderness  Upper ext: no edema, reflexes +2  Lower ext: no edema, neg lucy's, reflexes +2  Skin: no rashes or lesions  Psychiatric:Mood/ Affect: appropriate  Genitourinary: SVE: not examined  FHT: Category 1 with mod variability and + accels; reactive  TOCO: no ctx  Abdominal ultrasound - BPP 8/8; vertex    I personally reviewed pt's medical record including relevant labs and ultrasounds    Assessment/Plan:  Pt is reassured. She is discharged to home. Pt to f/u with her PObP.

## 2018-03-26 NOTE — IP AVS SNAPSHOT
[address] [place]  
[phone] Patient: Catherine Hernandez 
MRN: [id number] NKB:4[birth year] A check ousmane indicates which time of day the medication should be taken. My Medications ASK your doctor about these medications Instructions Each Dose to Equal  
 Morning Noon Evening Bedtime  
 ascorbic acid (vitamin C) 500 mg tablet Commonly known as:  VITAMIN C Your last dose was: Your next dose is: Take 1 Tab by mouth three (3) times daily. 500 mg  
    
   
   
   
  
 aspirin 81 mg chewable tablet Your last dose was: Your next dose is: Take 81 mg by mouth daily. 81 mg  
    
   
   
   
  
 calcium carbonate 200 mg calcium (500 mg) Chew Commonly known as:  TUMS Your last dose was: Your next dose is: Take 1 Tab by mouth daily. 1 Tab  
    
   
   
   
  
 ferrous sulfate 325 mg (65 mg iron) tablet Commonly known as:  Iron (Ferrous Sulfate) Your last dose was: Your next dose is: Take 1 Tab by mouth three (3) times daily. 325 mg  
    
   
   
   
  
 * insulin detemir U-100 100 unit/mL (3 mL) Inpn Commonly known as:  LEVEMIR FLEXTOUCH U-100 INSULN Your last dose was: Your next dose is:    
   
   
 6-25 units as directed twice daily; May substitute Lantus * insulin detemir U-100 100 unit/mL (3 mL) Inpn Commonly known as:  LEVEMIR FLEXTOUCH U-100 INSULN Your last dose was: Your next dose is:    
   
   
 6-25 units as directed twice daily; May substitute Lantus  
     
   
   
   
  
 insulin lispro 100 unit/mL kwikpen Commonly known as:  HUMALOG Your last dose was: Your next dose is:    
   
   
 6 to 25 units with meals as directed. * Insulin Needles (Disposable) 32 gauge x 5/32\" Ndle Commonly known as:  Jhoana Antony Your last dose was: Your next dose is:    
   
   
 1 Pen Needle by Does Not Apply route five (5) times daily. 1 Pen Needle * Insulin Needles (Disposable) 32 gauge x 5/32\" Ndle Commonly known as:  Jhoana Antony Your last dose was: Your next dose is:    
   
   
 1 Pen Needle by Does Not Apply route five (5) times daily. 1 Pen Needle PEPCID AC 10 mg tablet Generic drug:  famotidine Your last dose was: Your next dose is: Take 10 mg by mouth two (2) times a day. 10 mg PRENATAL GUMMY PO Your last dose was: Your next dose is: Take  by mouth. TYLENOL EXTRA STRENGTH 500 mg tablet Generic drug:  acetaminophen Your last dose was: Your next dose is: Take  by mouth every six (6) hours as needed for Pain. * Notice: This list has 4 medication(s) that are the same as other medications prescribed for you. Read the directions carefully, and ask your doctor or other care provider to review them with you.

## 2018-03-26 NOTE — DISCHARGE INSTRUCTIONS
Pregnancy Precautions: Care Instructions  Your Care Instructions    There is no sure way to prevent labor before your due date ( labor) or to prevent most other pregnancy problems. But there are things you can do to increase your chances of a healthy pregnancy. Go to your appointments, follow your doctor's advice, and take good care of yourself. Eat well, and exercise (if your doctor agrees). And make sure to drink plenty of water. Follow-up care is a key part of your treatment and safety. Be sure to make and go to all appointments, and call your doctor if you are having problems. It's also a good idea to know your test results and keep a list of the medicines you take. How can you care for yourself at home? · Make sure you go to your prenatal appointments. At each visit, your doctor will check your blood pressure. Your doctor will also check to see if you have protein in your urine. High blood pressure and protein in urine are signs of preeclampsia. This condition can be dangerous for you and your baby. · Drink plenty of fluids, enough so that your urine is light yellow or clear like water. Dehydration can cause contractions. If you have kidney, heart, or liver disease and have to limit fluids, talk with your doctor before you increase the amount of fluids you drink. · Tell your doctor right away if you notice any symptoms of an infection, such as:  ¨ Burning when you urinate. ¨ A foul-smelling discharge from your vagina. ¨ Vaginal itching. ¨ Unexplained fever. ¨ Unusual pain or soreness in your uterus or lower belly. · Eat a balanced diet. Include plenty of foods that are high in calcium and iron. ¨ Foods high in calcium include milk, cheese, yogurt, almonds, and broccoli. ¨ Foods high in iron include red meat, shellfish, poultry, eggs, beans, raisins, whole-grain bread, and leafy green vegetables. · Do not smoke.  If you need help quitting, talk to your doctor about stop-smoking programs and medicines. These can increase your chances of quitting for good. · Do not drink alcohol or use illegal drugs. · Follow your doctor's directions about activity. Your doctor will let you know how much, if any, exercise you can do. · Ask your doctor if you can have sex. If you are at risk for early labor, your doctor may ask you to not have sex. · Take care to prevent falls. During pregnancy, your joints are loose, and your balance is off. Sports such as bicycling, skiing, or in-line skating can increase your risk of falling. And don't ride horses or motorcycles, dive, water ski, scuba dive, or parachute jump while you are pregnant. · Avoid getting very hot. Do not use saunas or hot tubs. Avoid staying out in the sun in hot weather for long periods. Take acetaminophen (Tylenol) to lower a high fever. · Do not take any over-the-counter or herbal medicines or supplements without talking to your doctor or pharmacist first.  When should you call for help? Call 911 anytime you think you may need emergency care. For example, call if:  ? · You passed out (lost consciousness). ? · You have severe vaginal bleeding. ? · You have severe pain in your belly or pelvis. ? · You have had fluid gushing or leaking from your vagina and you know or think the umbilical cord is bulging into your vagina. If this happens, immediately get down on your knees so your rear end (buttocks) is higher than your head. This will decrease the pressure on the cord until help arrives. ?Call your doctor now or seek immediate medical care if:  ? · You have signs of preeclampsia, such as:  ¨ Sudden swelling of your face, hands, or feet. ¨ New vision problems (such as dimness or blurring). ¨ A severe headache. ? · You have any vaginal bleeding. ? · You have belly pain or cramping. ? · You have a fever. ? · You have had regular contractions (with or without pain) for an hour.  This means that you have 8 or more within 1 hour or 4 or more in 20 minutes after you change your position and drink fluids. ? · You have a sudden release of fluid from your vagina. ? · You have low back pain or pelvic pressure that does not go away. ? · You notice that your baby has stopped moving or is moving much less than normal.   ? Watch closely for changes in your health, and be sure to contact your doctor if you have any problems. Where can you learn more? Go to http://alberto-dasha.info/. Enter [id number] in the search box to learn more about \"Pregnancy Precautions: Care Instructions. \"  Current as of: March 16, 2017  Content Version: 11.4  © 7994-4444 [employer]. Care instructions adapted under license by [employer] (which disclaims liability or warranty for this information). If you have questions about a medical condition or this instruction, always ask your healthcare professional. [address] any warranty or liability for your use of this information.

## 2018-03-26 NOTE — PROGRESS NOTES
03/26/18 1725   Fetal Vital Signs   Mode External   Fetal Heart Rate 135   Fetal Activity Present   Variability 6-25 BPM   Decelerations None   Accelerations Yes   FHR Interpretation Category I   RN Reviewed Strip?  Yes   Provider who reviewed strip? nordeen   Non Stress Test Reactive   Uterine Activity   Mode External   Frequency (min) 0

## 2018-03-27 PROBLEM — O36.8130 DECREASED FETAL MOVEMENT AFFECTING MANAGEMENT OF PREGNANCY IN THIRD TRIMESTER: Status: ACTIVE | Noted: 2018-03-26

## 2018-03-27 PROBLEM — O36.8390 FETAL ARRHYTHMIA AFFECTING PREGNANCY, ANTEPARTUM: Status: RESOLVED | Noted: 2018-01-16 | Resolved: 2018-03-27

## 2018-04-03 ENCOUNTER — HOSPITAL ENCOUNTER (EMERGENCY)
Age: 33
Discharge: HOME | End: 2018-04-03
Attending: OBSTETRICS & GYNECOLOGY | Admitting: OBSTETRICS & GYNECOLOGY
Payer: COMMERCIAL

## 2018-04-03 VITALS
SYSTOLIC BLOOD PRESSURE: 113 MMHG | WEIGHT: 149 LBS | HEIGHT: 62 IN | HEART RATE: 90 BPM | BODY MASS INDEX: 27.42 KG/M2 | TEMPERATURE: 99.5 F | DIASTOLIC BLOOD PRESSURE: 55 MMHG | RESPIRATION RATE: 18 BRPM

## 2018-04-03 PROBLEM — O26.893 ABDOMINAL PAIN IN PREGNANCY, THIRD TRIMESTER: Status: ACTIVE | Noted: 2018-04-03

## 2018-04-03 PROBLEM — O36.8130 DECREASED FETAL MOVEMENT AFFECTING MANAGEMENT OF PREGNANCY IN THIRD TRIMESTER: Status: RESOLVED | Noted: 2018-03-26 | Resolved: 2018-04-03

## 2018-04-03 PROBLEM — R10.9 ABDOMINAL PAIN IN PREGNANCY, THIRD TRIMESTER: Status: ACTIVE | Noted: 2018-04-03

## 2018-04-03 PROCEDURE — 87086 URINE CULTURE/COLONY COUNT: CPT | Performed by: OBSTETRICS & GYNECOLOGY

## 2018-04-03 PROCEDURE — 99283 EMERGENCY DEPT VISIT LOW MDM: CPT

## 2018-04-03 PROCEDURE — 59025 FETAL NON-STRESS TEST: CPT

## 2018-04-03 NOTE — DISCHARGE INSTRUCTIONS

## 2018-04-03 NOTE — ED PROVIDER NOTES
Chief Complaint: painful contractions      28 y.o. female [id number] at 35w0d  weeks gestation who is seen for moderate abdominal pain. Pt reports moderate uterine cramping with associated low back pain occurring for the past few hours. No vag bleeding. Some scant discharge. Good fetal movement. Pt reports diarrhea one episode yesterday and one today. No nausea or vomiting. Also c/o except that she has been having problems with low blood sugars. Pt is a gest diabetic on insulin with recent decrease in dose. Previous . Also c/o dysuria- concerned she may have a uti. HISTORY:    History   Sexual Activity    Sexual activity: Yes    Partners: Male    Birth control/ protection: None     Patient's last menstrual period was 2017. Social History     Social History    Marital status:      Spouse name: N/A    Number of children: N/A    Years of education: N/A     Occupational History    Not on file.      Social History Main Topics    Smoking status: Never Smoker    Smokeless tobacco: Never Used    Alcohol use No    Drug use: No    Sexual activity: Yes     Partners: Male     Birth control/ protection: None     Other Topics Concern    Not on file     Social History Narrative       Past Surgical History:   Procedure Laterality Date     DELIVERY ONLY      HX BREAST LUMPECTOMY Right     x3 benign    HX GYN  2009    Right fallopian tube removed with ectopic pregnancy    HX WISDOM TEETH EXTRACTION      LAP,TUBAL CAUTERY         Past Medical History:   Diagnosis Date    Anemia in pregnancy, third trimester 3/12/2018    Breast disorder     pt has 3 breast lumps on right side - one is tagged - PCP manages    Cholelithiasis with cholecystitis 2013    Diabetes ([address]     gest    Elevated prolactin level ([address] 4/3/2015    Gallbladder attack     Gestational diabetes     History of bladder infections     Hx of ectopic pregnancy 2009    Infertility, female    [facility] [place] Oligomenorrhea     Other ill-defined conditions(799.89)     hx of a ectopic pregnancy 09    Ovarian cyst     PCOS (polycystic ovarian syndrome)     Polycystic disease, ovaries     Previous recurrent miscarriages affecting pregnancy, antepartum          ROS:  A 12 point review of symptoms negative except for chief complaint as described above. PHYSICAL EXAM:  Blood pressure 113/55, pulse 90, temperature 99.5 °F (37.5 °C), resp. rate 18, height 5' 2\" (1.575 m), weight 67.6 kg (149 lb), last menstrual period 2017, currently breastfeeding. Constitutional: The patient appears well, alert, oriented x 3. Cardiovascular: Heart RRR, no murmurs. Respiratory: Lungs clear, no respiratory distress  GI: Abdomen soft, nontender, no guarding  No fundal tenderness  Musculoskeletal: no cva tenderness  Upper ext: no edema, reflexes +2  Lower ext: no edema, neg lucy's, reflexes +2  Skin: no rashes or lesions  Psychiatric:Mood/ Affect: appropriate  Genitourinary: SVE: cl/th  FHT:reactive, cat one  TOCO:rare contractions    I personally reviewed pt's medical record including relevant labs and ultrasounds    Assessment/Plan:  27 yo [id number] with preg complicated by gest diabetes, poly, previous   Now with c/o contractions- cl/th cervix, reactive nst- precautions and discharge  Urine culture sent.

## 2018-04-03 NOTE — IP AVS SNAPSHOT
[address] [place] [address] 
[phone] Patient: Wanda Paulino 
MRN: [id number] AOA:5/17/[id number] About your hospitalization You were admitted on:  N/A You last received care in the:  [facility] 4 [facility] You were discharged on:  April 3, 2018 Why you were hospitalized Your primary diagnosis was:  Not on File Your diagnoses also included:  Abdominal Pain In Pregnancy, Third Trimester Follow-up Information Follow up With Details Comments Contact Info Delon Chaka, [address] [place] [address] 
[phone] Your Scheduled Appointments Friday April 06, 2018 11:30 AM EDT Ultrasound plus physician visit with Shaunna Lynch MD, [facility] [address] [address]  
[phone] Tuesday April 10, 2018 11:15 AM EDT BIOPHYSICAL PROFILE with [facility] ULTRASOUND 2  
[facility] MATERNAL FETAL MEDICINE ([address] [place] [address]) [address] [place] [phone]  
[phone] Tuesday April 10, 2018 11:30 AM EDT  
OB VISIT with Jessica Willoughby MD  
[address] ([address]) [address] [place] [phone]  
[phone] Tuesday April 10, 2018 11:45 AM EDT  
DIABETIC RECHECK with [facility] DIABETIC ED [address] ([address]) [address] [place] [address]  
[phone] Thursday April 12, 2018  9:30 AM EDT Ultrasound plus physician visit with Shaunna Lynch MD, [place] [address] ([facility] [address] [id number] [phone] Tuesday April 17, 2018  8:00 AM EDT  
GROWTH BPP DOPPLER with [facility] ULTRASOUND 2  
[facility] MATERNAL FETAL MEDICINE ([address] [place] [address]) [address] [place] [address] [address] [place] [phone]  
[phone] Tuesday April 17, 2018  9:00 AM EDT  
DIABETIC RECHECK with [facility] DIABETIC ED [address] ([address] [place] [address]) [address] [address]  
[phone] Tuesday April 17, 2018  9:15 AM EDT  
OB VISIT with Orestes Hoskins MD  
[address] [facility] [address] ([address] [place] [address] Street) [address] [address]  
[phone] Tuesday April 24, 2018  8:00 AM EDT BIOPHYSICAL PROFILE with [facility] ULTRASOUND 1  
[facility] MATERNAL FETAL MEDICINE ([address] [place] [address] Street) [address] [address]  
[phone] Tuesday April 24, 2018  9:00 AM EDT  
DIABETIC RECHECK with [facility] DIABETIC ED [address] [place] [address] ([address] [place] [address]) [address] [address]  
[phone] Tuesday April 24, 2018  9:15 AM EDT  
OB VISIT with Dana Estrada MD  
[address] ([address] [place] [address] Street) [address] [address] [phone] Discharge Orders None A check ousmane indicates which time of day the medication should be taken. My Medications ASK your doctor about these medications Instructions Each Dose to Equal  
 Morning Noon Evening Bedtime  
 ascorbic acid (vitamin C) 500 mg tablet Commonly known as:  VITAMIN C Your last dose was: Your next dose is: Take 1 Tab by mouth three (3) times daily. 500 mg  
    
   
   
   
  
 aspirin 81 mg chewable tablet Your last dose was: Your next dose is: Take 81 mg by mouth daily. 81 mg  
    
   
   
   
  
 calcium carbonate 200 mg calcium (500 mg) Chew Commonly known as:  TUMS Your last dose was: Your next dose is: Take 1 Tab by mouth daily. 1 Tab ferrous sulfate 325 mg (65 mg iron) tablet Commonly known as:  Iron (Ferrous Sulfate) Your last dose was: Your next dose is: Take 1 Tab by mouth three (3) times daily. 325 mg  
    
   
   
   
  
 * insulin detemir U-100 100 unit/mL (3 mL) Inpn Commonly known as:  LEVEMIR FLEXTOUCH U-100 INSULN Your last dose was: Your next dose is:    
   
   
 6-25 units as directed twice daily; May substitute Lantus * insulin detemir U-100 100 unit/mL (3 mL) Inpn Commonly known as:  LEVEMIR FLEXTOUCH U-100 INSULN Your last dose was: Your next dose is:    
   
   
 6-25 units as directed twice daily; May substitute Lantus  
     
   
   
   
  
 insulin lispro 100 unit/mL kwikpen Commonly known as:  HUMALOG Your last dose was: Your next dose is:    
   
   
 6 to 25 units with meals as directed. * Insulin Needles (Disposable) 32 gauge x 5/32\" Ndle Commonly known as:  Amelia Sidhu Your last dose was: Your next dose is:    
   
   
 1 Pen Needle by Does Not Apply route five (5) times daily. 1 Pen Needle * Insulin Needles (Disposable) 32 gauge x 5/32\" Ndle Commonly known as:  Amelia Nahum Your last dose was: Your next dose is:    
   
   
 1 Pen Needle by Does Not Apply route five (5) times daily. 1 Pen Needle PEPCID AC 10 mg tablet Generic drug:  famotidine Your last dose was: Your next dose is: Take 10 mg by mouth two (2) times a day. 10 mg PRENATAL GUMMY PO Your last dose was: Your next dose is: Take  by mouth. TYLENOL EXTRA STRENGTH 500 mg tablet Generic drug:  acetaminophen Your last dose was: Your next dose is: Take  by mouth every six (6) hours as needed for Pain. * Notice: This list has 4 medication(s) that are the same as other medications prescribed for you. Read the directions carefully, and ask your doctor or other care provider to review them with you. Discharge Instructions Weeks 34 to 36 of Your Pregnancy: Care Instructions Your Care Instructions By now, your baby and your belly have grown quite large. It is almost time to give birth. A full-term pregnancy can deliver between 37 and 42 weeks. Your baby's lungs are almost ready to breathe air. The bones in your baby's head are now firm enough to protect it, but soft enough to move down through the birth canal. 
You may feel excited, happy, anxious, or scared. You may wonder how you will know if you are in labor or what to expect during labor. Try to be flexible in your expectations of the birth. Because each birth is different, there is no way to know exactly what childbirth will be like for you. This care sheet will help you know what to expect and how to prepare. This may make your childbirth easier. If you haven't already had the Tdap shot during this pregnancy, talk to your doctor about getting it. It will help protect your  against pertussis infection. In the 36th week, most women have a test for group B streptococcus (GBS). GBS is a common bacteria that can live in the vagina and rectum. It can make your baby sick after birth. If you test positive, you will get antibiotics during labor. The medicine will keep your baby from getting the bacteria. Follow-up care is a key part of your treatment and safety. Be sure to make and go to all appointments, and call your doctor if you are having problems. It's also a good idea to know your test results and keep a list of the medicines you take. How can you care for yourself at home? Learn about pain relief choices · Pain is different for every woman. Talk with your doctor about your feelings about pain. · You can choose from several types of pain relief. These include medicine or breathing techniques, as well as comfort measures. You can use more than one option. · If you choose to have pain medicine during labor, talk to your doctor about your options. Some medicines lower anxiety and help with some of the pain. Others make your lower body numb so that you won't feel pain. · Be sure to tell your doctor about your pain medicine choice before you start labor or very early in your labor. You may be able to change your mind as labor progresses. · Rarely, a woman is put to sleep by medicine given through a mask or an IV. Labor and delivery · The first stage of labor has three parts: early, active, and transition. ¨ Most women have early labor at home. You can stay busy or rest, eat light snacks, drink clear fluids, and start counting contractions. ¨ When talking during a contraction gets hard, you may be moving to active labor. During active labor, you should head for the hospital if you are not there already. ¨ You are in active labor when contractions come every 3 to 4 minutes and last about 60 seconds. Your cervix is opening more rapidly. ¨ If your water breaks, contractions will come faster and stronger. ¨ During transition, your cervix is stretching, and contractions are coming more rapidly. ¨ You may want to push, but your cervix might not be ready. Your doctor will tell you when to push. · The second stage starts when your cervix is completely opened and you are ready to push. ¨ Contractions are very strong to push the baby down the birth canal. 
¨ You will feel the urge to push. You may feel like you need to have a bowel movement. ¨ You may be coached to push with contractions. These contractions will be very strong, but you will not have them as often. You can get a little rest between contractions. ¨ You may be emotional and irritable. You may not be aware of what is going on around you. ¨ One last push, and your baby is born. · The third stage is when a few more contractions push out the placenta. This may take 30 minutes or less. · The fourth stage is the welcome recovery. You may feel overwhelmed with emotions and exhausted but alert. This is a good time to start breastfeeding. Where can you learn more? Go to http://alberto-dasha.info/. Enter E603 in the search box to learn more about \"Weeks 34 to 36 of Your Pregnancy: Care Instructions. \" Current as of: March 16, 2017 Content Version: 11.4 © 2904-1389 [employer]. Care instructions adapted under license by [employer] (which disclaims liability or warranty for this information). If you have questions about a medical condition or this instruction, always ask your healthcare professional. [address] any warranty or liability for your use of this information. Introducing [facility]! Dear Panfilo Jerez: Thank you for requesting a [employer] account. Our records indicate that you already have an active [email or web address] account. You can access your account anytime at [email or web address] Did you know that you can access your hospital and ER discharge instructions at any time in [email or web address]? You can also review all of your test results from your hospital stay or ER visit. Additional Information If you have questions, please visit the Frequently Asked Questions section of the [email or web address] website at [email or web address] Remember, [email or web address][employer]TV is NOT to be used for urgent needs. For medical emergencies, dial 911. Now available from your iPhone and Android! Introducing Ash Dye As a Jacquie Slocumb patient, I wanted to make you aware of our electronic visit tool called Ash Dye. [facility]ie [facility] 24/7 allows you to connect within minutes with a medical provider 24 hours a day, seven days a week via a mobile device or tablet or logging into a secure website from your computer. You can access Design A from anywhere in the [place]. A virtual visit might be right for you when you have a simple condition and feel like you just dont want to get out of bed, or cant get away from work for an appointment, when your regular [employer] provider is not available (evenings, weekends or holidays), or when youre out of town and need minor care. Electronic visits cost only $49 and if the [employer] 24/7 provider determines a prescription is needed to treat your condition, one can be electronically transmitted to a nearby pharmacy*. Please take a moment to enroll today if you have not already done so. The enrollment process is free and takes just a few minutes. To enroll, please download the [employer] 24/7 netta to your tablet or phone, or visit [email or web address] to enroll on your computer. And, as an [address] [place] [address] patient with a [employer] account, the results of your visits will be scanned into your electronic medical record and your primary care provider will be able to view the scanned results. We urge you to continue to see your regular [employer] provider for your ongoing medical care. And while your primary care provider may not be the one available when you seek a Ash Mitochon Systemsmend[employer] virtual visit, the peace of mind you get from getting a real diagnosis real time can be priceless. For more information on Design A, view our Frequently Asked Questions (FAQs) at [email or web address]. Sincerely, 
 
Carolann Doss MD 
Chief Medical Officer Khushi Wright *:  certain medications cannot be prescribed via Design A Providers Seen During Your Hospitalization Provider Specialty Primary office phone My Patel MD Obstetrics & Gynecology [phone] Your Primary Care Physician (PCP) Primary Care Physician Office Phone Office Fax Kathryn Mccormack [phone] You are allergic to the following Allergen Reactions Latex Hives Adhesive Tape-Silicones Rash Recent Documentation Height Weight BMI OB Status Smoking Status 1.575 m 67.6 kg 27.25 kg/m2 Pregnant Never Smoker Emergency Contacts Name Discharge Info Relation Home Work Mobile Rogelio Young  Spouse [3] [phone] Patient Belongings The following personal items are in your possession at time of discharge: 
                             
 
  
  
 Please provide this summary of care documentation to your next provider. Signatures-by signing, you are acknowledging that this After Visit Summary has been reviewed with you and you have received a copy. Patient Signature:  ____________________________________________________________ Date:  ____________________________________________________________  
  
[facility] Provider Signature:  ____________________________________________________________ Date:  ____________________________________________________________

## 2018-04-03 NOTE — IP AVS SNAPSHOT
[address] [place]  
[phone] Patient: Tariq Jones 
MRN: [id number] BSY:7/82/5198 A check ousmane indicates which time of day the medication should be taken. My Medications ASK your doctor about these medications Instructions Each Dose to Equal  
 Morning Noon Evening Bedtime  
 ascorbic acid (vitamin C) 500 mg tablet Commonly known as:  VITAMIN C Your last dose was: Your next dose is: Take 1 Tab by mouth three (3) times daily. 500 mg  
    
   
   
   
  
 aspirin 81 mg chewable tablet Your last dose was: Your next dose is: Take 81 mg by mouth daily. 81 mg  
    
   
   
   
  
 calcium carbonate 200 mg calcium (500 mg) Chew Commonly known as:  TUMS Your last dose was: Your next dose is: Take 1 Tab by mouth daily. 1 Tab  
    
   
   
   
  
 ferrous sulfate 325 mg (65 mg iron) tablet Commonly known as:  Iron (Ferrous Sulfate) Your last dose was: Your next dose is: Take 1 Tab by mouth three (3) times daily. 325 mg  
    
   
   
   
  
 * insulin detemir U-100 100 unit/mL (3 mL) Inpn Commonly known as:  LEVEMIR FLEXTOUCH U-100 INSULN Your last dose was: Your next dose is:    
   
   
 6-25 units as directed twice daily; May substitute Lantus * insulin detemir U-100 100 unit/mL (3 mL) Inpn Commonly known as:  LEVEMIR FLEXTOUCH U-100 INSULN Your last dose was: Your next dose is:    
   
   
 6-25 units as directed twice daily; May substitute Lantus  
     
   
   
   
  
 insulin lispro 100 unit/mL kwikpen Commonly known as:  HUMALOG Your last dose was: Your next dose is:    
   
   
 6 to 25 units with meals as directed. * Insulin Needles (Disposable) 32 gauge x 5/32\" Ndle Commonly known as:  Rafael Hensley Your last dose was: Your next dose is:    
   
   
 1 Pen Needle by Does Not Apply route five (5) times daily. 1 Pen Needle * Insulin Needles (Disposable) 32 gauge x 5/32\" Ndle Commonly known as:  Rafael Hensley Your last dose was: Your next dose is:    
   
   
 1 Pen Needle by Does Not Apply route five (5) times daily. 1 Pen Needle PEPCID AC 10 mg tablet Generic drug:  famotidine Your last dose was: Your next dose is: Take 10 mg by mouth two (2) times a day. 10 mg PRENATAL GUMMY PO Your last dose was: Your next dose is: Take  by mouth. TYLENOL EXTRA STRENGTH 500 mg tablet Generic drug:  acetaminophen Your last dose was: Your next dose is: Take  by mouth every six (6) hours as needed for Pain. * Notice: This list has 4 medication(s) that are the same as other medications prescribed for you. Read the directions carefully, and ask your doctor or other care provider to review them with you.

## 2018-04-06 LAB
BACTERIA SPEC CULT: NORMAL
SERVICE CMNT-IMP: NORMAL

## 2018-04-10 PROBLEM — Z86.32 HISTORY OF GESTATIONAL DIABETES IN PRIOR PREGNANCY, CURRENTLY PREGNANT, THIRD TRIMESTER: Status: RESOLVED | Noted: 2017-11-01 | Resolved: 2018-04-10

## 2018-04-10 PROBLEM — O09.293 HISTORY OF GESTATIONAL DIABETES IN PRIOR PREGNANCY, CURRENTLY PREGNANT, THIRD TRIMESTER: Status: RESOLVED | Noted: 2017-11-01 | Resolved: 2018-04-10

## 2018-04-12 ENCOUNTER — HOSPITAL ENCOUNTER (EMERGENCY)
Age: 33
Discharge: HOME | End: 2018-04-12
Attending: OBSTETRICS & GYNECOLOGY | Admitting: OBSTETRICS & GYNECOLOGY
Payer: COMMERCIAL

## 2018-04-12 VITALS — RESPIRATION RATE: 18 BRPM | HEART RATE: 92 BPM | HEIGHT: 62 IN | BODY MASS INDEX: 27.44 KG/M2 | TEMPERATURE: 98.9 F

## 2018-04-12 PROBLEM — Z34.90 PREGNANCY: Status: ACTIVE | Noted: 2018-04-12

## 2018-04-12 LAB
ERYTHROCYTE [DISTWIDTH] IN BLOOD BY AUTOMATED COUNT: 24.9 % (ref 11.9–14.6)
GLUCOSE BLD STRIP.AUTO-MCNC: 89 MG/DL (ref 65–100)
HCT VFR BLD AUTO: 33.3 % (ref 35.8–46.3)
HGB BLD-MCNC: 10.3 G/DL (ref 11.7–15.4)
MCH RBC QN AUTO: 27.9 PG (ref 26.1–32.9)
MCHC RBC AUTO-ENTMCNC: 30.9 G/DL (ref 31.4–35)
MCV RBC AUTO: 90.2 FL (ref 79.6–97.8)
PLATELET # BLD AUTO: 279 K/UL (ref 150–450)
PMV BLD AUTO: 9.7 FL (ref 10.8–14.1)
RBC # BLD AUTO: 3.69 M/UL (ref 4.05–5.25)
WBC # BLD AUTO: 9.3 K/UL (ref 4.3–11.1)

## 2018-04-12 PROCEDURE — 59025 FETAL NON-STRESS TEST: CPT

## 2018-04-12 PROCEDURE — 99285 EMERGENCY DEPT VISIT HI MDM: CPT

## 2018-04-12 PROCEDURE — 82962 GLUCOSE BLOOD TEST: CPT

## 2018-04-12 PROCEDURE — 85027 COMPLETE CBC AUTOMATED: CPT | Performed by: OBSTETRICS & GYNECOLOGY

## 2018-04-12 PROCEDURE — 74011250637 HC RX REV CODE- 250/637: Performed by: OBSTETRICS & GYNECOLOGY

## 2018-04-12 RX ORDER — FAMOTIDINE 20 MG/1
20 TABLET, FILM COATED ORAL
Status: COMPLETED | OUTPATIENT
Start: 2018-04-12 | End: 2018-04-12

## 2018-04-12 RX ADMIN — FAMOTIDINE 20 MG: 20 TABLET, FILM COATED ORAL at 12:32

## 2018-04-12 NOTE — H&P
History & Physical    Name: Lorenzo Smiley MRN: [id number]  SSN: [id number]    YOB: 1985  Age: 28 y.o. Sex: female      Subjective:     Reason for Admission:  Pregnancy and BPP 6/8 in the office    History of Present Illness: Ms. Raffi Mishra is a 28 y.o.  female with an estimated gestational age of 37w1d with Estimated Date of Delivery: 18. Patient complains of intermittent contractions, abd pain and difficulty feeling fetal movement due to her polyhydramnios  Pregnancy has been complicated by IDDM, polyhydramnios, recurrent miscarriage, prior section, anemia, large for dates and she has been seeing MFM. BPP in the office today was 6/8 and given her NAEL is 32, difficult to perform NST adequately in our office on previous attempts prompted rec to send to L&D for extended monitoring. . Patient denies shortness of breath, swelling, vaginal bleeding , vaginal leaking of fluid  and visual disturbances. OB History    Para Term  AB Living   10 1 1 0 8 1   SAB TAB Ectopic Molar Multiple Live Births   7 0 1 0 0 1      # Outcome Date GA Lbr Rashaun/2nd Weight Sex Delivery Anes PTL Lv   10 Current            9 Term 16 38w3d  7 lb 3.2 oz (3.265 kg) F CS-LTranv SPINAL AN N BRIANA      Complications: Fetal Intolerance      Birth Comments: GDM (insulin)   8 Ectopic            7 SAB            6 SAB            5 SAB            4 SAB            3 SAB            2 SAB            1 SAB               Obstetric Comments   All SAB before 12 weeks. No D&C required with any.      Past Medical History:   Diagnosis Date    Anemia in pregnancy, third trimester 3/12/2018    Breast disorder     pt has 3 breast lumps on right side - one is tagged - PCP manages    Cholelithiasis with cholecystitis 2013    Diabetes ([address]     gest    Elevated prolactin level ([address] 4/3/2015    Gallbladder attack     Gestational diabetes     History of bladder infections     Hx of ectopic pregnancy   Infertility, female     Oligomenorrhea     Other ill-defined conditions(799.89)     hx of a ectopic pregnancy 09    Ovarian cyst     PCOS (polycystic ovarian syndrome)     Polycystic disease, ovaries     Previous recurrent miscarriages affecting pregnancy, antepartum      Past Surgical History:   Procedure Laterality Date     DELIVERY ONLY      HX BREAST LUMPECTOMY Right     x3 benign    HX GYN  2009    Right fallopian tube removed with ectopic pregnancy    HX WISDOM TEETH EXTRACTION      LAP,TUBAL CAUTERY       Social History     Occupational History    Not on file. Social History Main Topics    Smoking status: Never Smoker    Smokeless tobacco: Never Used    Alcohol use No    Drug use: No    Sexual activity: Yes     Partners: Male     Birth control/ protection: None      Family History   Problem Relation Age of Onset    Cancer Mother      uterine    Hypertension Mother     Elevated Lipids Father     Cancer Father      Basal cell ca at nose and prostate cancer    Cancer Paternal Grandfather      melanoma    Deep Vein Thrombosis Paternal Grandfather     Colon Cancer Maternal Uncle     Lung Disease Maternal Uncle     Diabetes Maternal Grandmother     Hypertension Maternal Grandmother     Heart Disease Maternal Grandmother        Allergies   Allergen Reactions    Latex Hives    Adhesive Tape-Silicones Rash     Prior to Admission medications    Medication Sig Start Date End Date Taking? Authorizing Provider   ferrous sulfate (IRON, FERROUS SULFATE,) 325 mg (65 mg iron) tablet Take 1 Tab by mouth three (3) times daily. 3/23/18   Rosa Schwab MD   ascorbic acid, vitamin C, (VITAMIN C) 500 mg tablet Take 1 Tab by mouth three (3) times daily. 3/23/18   Rosa Schwab MD   famotidine (PEPCID AC) 10 mg tablet Take 10 mg by mouth two (2) times a day. Historical Provider   insulin lispro (HUMALOG) 100 unit/mL kwikpen 6 to 25 units with meals as directed.  2/15/18 Johnn Litten, MD   insulin detemir (LEVEMIR FLEXTOUCH) 100 unit/mL (3 mL) inpn 6-25 units as directed twice daily; May substitute Lantus 17   Johnn Litten, MD   Insulin Needles, Disposable, (UNIFINE PENTIPS) 32 gauge x 5/32\" ndle 1 Pen Needle by Does Not Apply route five (5) times daily. 17   Johnn Litten, MD   calcium carbonate (TUMS) 200 mg calcium (500 mg) chew Take 1 Tab by mouth daily. Historical Provider   acetaminophen (TYLENOL EXTRA STRENGTH) 500 mg tablet Take  by mouth every six (6) hours as needed for Pain. Historical Provider   insulin detemir (LEVEMIR FLEXTOUCH) 100 unit/mL (3 mL) inpn 6-25 units as directed twice daily; May substitute Lantus 17   Johnn Litten, MD   Insulin Needles, Disposable, (UNIFINE PENTIPS) 32 gauge x 5/32\" ndle 1 Pen Needle by Does Not Apply route five (5) times daily. 17   Johnn Litten, MD   PNV62/FA/OM3/DHA/EPA/FISH OIL (PRENATAL GUMMY PO) Take  by mouth. Historical Provider        Review of Systems:  A comprehensive review of systems was negative except for that written in the History of Present Illness. Objective:     Vitals:    Vitals:    18 1151 18 1152   Pulse:  92   Resp:  18   Temp:  98.9 °F (37.2 °C)   Height: 5' 2\" (1.575 m)       Temp (24hrs), Av.9 °F (37.2 °C), Min:98.9 °F (37.2 °C), Max:98.9 °F (37.2 °C)    BP  Min: 102/60  Max: 102/60     Physical Exam: performed in the outpatient office prior to sending to hospital  Patient without distress.   Heart: Regular rate   Lung: normal respiratory effort  Abdomen: soft, nontender, gravid  Cervical Exam: Deferred  Lower Extremities:  - Edema No     Membranes:  Intact  Uterine Activity:  None palpated  Fetal Heart Rate:  RN confirmed reactive NST thus overall testing today is 8/10        Lab/Data Review:  Recent Results (from the past 24 hour(s))   GLUCOSE, POC    Collection Time: 18 12:16 PM   Result Value Ref Range    Glucose (POC) 89 65 - 100 mg/dL   CBC W/O DIFF    Collection Time: 18 12:18 PM   Result Value Ref Range    WBC 9.3 4.3 - 11.1 K/uL    RBC 3.69 (L) 4.05 - 5.25 M/uL    HGB 10.3 (L) 11.7 - 15.4 g/dL    HCT 33.3 (L) 35.8 - 46.3 %    MCV 90.2 79.6 - 97.8 FL    MCH 27.9 26.1 - 32.9 PG    MCHC 30.9 (L) 31.4 - 35.0 g/dL    RDW 24.9 (H) 11.9 - 14.6 %    PLATELET 422 838 - 456 K/uL    MPV 9.7 (L) 10.8 - 14.1 FL       Assessment and Plan:     Principal Problem:    Insulin controlled gestational diabetes mellitus in third trimester (2017)      Overview: 4/3/2018 at [facility]:  Reassuring fetal status. BPP 8/8, NAEL 32 cm (DVP 9.8). BG logbook reviewed. BG ranges . Having occasional mild FBG and       PP elevations with continued hypoglycemic lows of 30-50s after meals. Instructed pt to increase proteins/fats with meal to better sustain BG       levels. Current Dose is now Levemir /.      4/10/2018: Reassuring fetal status. BPP 8/8, NAEL 32.1 cm (DVP 10.2 cm). Glucose log reviewed. Ranges from 89 to 124. Just a few PP elevations and       1 FBG elevation. Continue current dosing. Current Dose is now Levemir 20       in the AM only. RECOMMENDATIONS:      · Twice weekly testing, MFM on  and primary OB on . · Decrease Levemir to above dose; adjust prn for elevations. · Repeat US for growth and diabetes check in 1 week at [facility]. · Plan repeat C/S at 38 to 39 weeks at this time. Active Problems:    Pregnancy complicated by previous recurrent miscarriages in third trimester ()      Overview: Hx of Ectopic x 1 and SAB x 7      Previous  delivery affecting pregnancy (2017)      Echogenic focus of heart of fetus affecting antepartum care of mother (2017)      Overview: Isolated, remainder of level 2 ultrasound normal.      2018 at [facility]:  Accelerated fetal growth; no PAC's noted today. Isolated EIF still seen. AC 83%, overall 68%, NAEL 20 cm.        High-risk pregnancy in third trimester (12/21/2017)      Overview: Hx GDM(A2), early GDM(A2)      Rec Preg Loss      C/S      Polyhydramnios in third trimester (2/22/2018)      Overview: 3/6/2018 at [facility]:  NAEL 31.8 cm (DVP 10.7).      4/3/2018 at [facility]:  NAEL 32 (DVP 9.8); stable. · PTL precautions given. Maternal care for excessive fetal growth in third trimester (3/6/2018)      Overview: 3/27/2018 at [facility]:  Appropriate fetal growth. AC 73%, overall 59%, NAEL       32.3 cm, UA Dopplers WNL, BPP 6/8. On monitor for NST, Reactive       Anemia in pregnancy, third trimester (3/12/2018)      Overview: POC hgb today 9.2       Rx FeSO4  TID w/ VIt C--pt states wasn't aware was supposed to be taking      Repeat CBC in 2wks--heme/onc ref if necessary      3/27/2018 at [facility]: Continues taking Fe daily. Last Hgb on 3/23/2018: 9.8            Abdominal pain in pregnancy, third trimester (4/3/2018)       Will plan to discharge home with plans to continue her current insulin regimen, keep appt for BPP in our office early next week, strict precautions for kick counts have been discussed. Will discuss plans for timing of delivery with MFM, given the lability of her sugars and her NAEL 32, may need to consider 37-38 wks instead of 39.      Signed By:  Brian Herrera MD     April 12, 2018

## 2018-04-12 NOTE — IP AVS SNAPSHOT
[address] [place] [address] 
[phone] Patient: Marion Curran 
MRN: [id number] KFD:2/81/1759 About your hospitalization You were admitted on:  N/A You last received care in the:  [facility] 4 [facility] You were discharged on:  April 12, 2018 Why you were hospitalized Your primary diagnosis was:  Not on File Your diagnoses also included:  Pregnancy Follow-up Information None Your Scheduled Appointments Tuesday April 17, 2018  8:00 AM EDT  
GROWTH BPP DOPPLER with [facility] ULTRASOUND 2  
[facility] MATERNAL FETAL MEDICINE ([address] [place] [address]) [address] [address]  
[phone] Tuesday April 17, 2018  8:30 AM EDT  
DIABETIC RECHECK with [facility] DIABETIC ED [address] [address] [place] ([address]) [address] [address]  
[phone] Tuesday April 17, 2018  9:15 AM EDT  
OB VISIT with Sridhar Hein MD  
[address] ([address]) [address] [address]  
[phone] Thursday April 19, 2018  8:00 AM EDT Return OB with Nolberto Borja MD, [address] [address]  
[phone] Tuesday April 24, 2018  8:00 AM EDT BIOPHYSICAL PROFILE with [facility] ULTRASOUND 1  
[facility] MATERNAL FETAL MEDICINE ([address] [place] [address]) [address] [address]  
[phone] Tuesday April 24, 2018  9:00 AM EDT  
DIABETIC RECHECK with [facility] DIABETIC ED [address] ([address]) [address] [address]  
[phone] Tuesday April 24, 2018  9:15 AM EDT  
OB VISIT with Antonio Del Castillo MD  
[address] ([address]) 105 [facility] [place] [facility] [address] [place] [id number] [phone] Discharge Orders None A check ousmane indicates which time of day the medication should be taken. My Medications ASK your doctor about these medications Instructions Each Dose to Equal  
 Morning Noon Evening Bedtime  
 ascorbic acid (vitamin C) 500 mg tablet Commonly known as:  VITAMIN C Your last dose was: Your next dose is: Take 1 Tab by mouth three (3) times daily. 500 mg  
    
   
   
   
  
 calcium carbonate 200 mg calcium (500 mg) Chew Commonly known as:  TUMS Your last dose was: Your next dose is: Take 1 Tab by mouth daily. 1 Tab  
    
   
   
   
  
 ferrous sulfate 325 mg (65 mg iron) tablet Commonly known as:  Iron (Ferrous Sulfate) Your last dose was: Your next dose is: Take 1 Tab by mouth three (3) times daily. 325 mg  
    
   
   
   
  
 * insulin detemir U-100 100 unit/mL (3 mL) Inpn Commonly known as:  LEVEMIR FLEXTOUCH U-100 INSULN Your last dose was: Your next dose is:    
   
   
 6-25 units as directed twice daily; May substitute Lantus * insulin detemir U-100 100 unit/mL (3 mL) Inpn Commonly known as:  LEVEMIR FLEXTOUCH U-100 INSULN Your last dose was: Your next dose is:    
   
   
 6-25 units as directed twice daily; May substitute Lantus  
     
   
   
   
  
 insulin lispro 100 unit/mL kwikpen Commonly known as:  HUMALOG Your last dose was: Your next dose is:    
   
   
 6 to 25 units with meals as directed. * Insulin Needles (Disposable) 32 gauge x 5/32\" Ndle Commonly known as:  Lalo Stapleton Your last dose was: Your next dose is:    
   
   
 1 Pen Needle by Does Not Apply route five (5) times daily. 1 Pen Needle * Insulin Needles (Disposable) 32 gauge x 5/32\" Ndle Commonly known as:  Rosamaria Landry Your last dose was: Your next dose is:    
   
   
 1 Pen Needle by Does Not Apply route five (5) times daily. 1 Pen Needle PEPCID AC 10 mg tablet Generic drug:  famotidine Your last dose was: Your next dose is: Take 10 mg by mouth two (2) times a day. 10 mg PRENATAL GUMMY PO Your last dose was: Your next dose is: Take  by mouth. TYLENOL EXTRA STRENGTH 500 mg tablet Generic drug:  acetaminophen Your last dose was: Your next dose is: Take  by mouth every six (6) hours as needed for Pain. * Notice: This list has 4 medication(s) that are the same as other medications prescribed for you. Read the directions carefully, and ask your doctor or other care provider to review them with you. Discharge Instructions Pregnancy Precautions: Care Instructions Your Care Instructions There is no sure way to prevent labor before your due date ( labor) or to prevent most other pregnancy problems. But there are things you can do to increase your chances of a healthy pregnancy. Go to your appointments, follow your doctor's advice, and take good care of yourself. Eat well, and exercise (if your doctor agrees). And make sure to drink plenty of water. Follow-up care is a key part of your treatment and safety. Be sure to make and go to all appointments, and call your doctor if you are having problems. It's also a good idea to know your test results and keep a list of the medicines you take. How can you care for yourself at home? · Make sure you go to your prenatal appointments. At each visit, your doctor will check your blood pressure. Your doctor will also check to see if you have protein in your urine.  High blood pressure and protein in urine are signs of preeclampsia. This condition can be dangerous for you and your baby. · Drink plenty of fluids, enough so that your urine is light yellow or clear like water. Dehydration can cause contractions. If you have kidney, heart, or liver disease and have to limit fluids, talk with your doctor before you increase the amount of fluids you drink. · Tell your doctor right away if you notice any symptoms of an infection, such as: ¨ Burning when you urinate. ¨ A foul-smelling discharge from your vagina. ¨ Vaginal itching. ¨ Unexplained fever. ¨ Unusual pain or soreness in your uterus or lower belly. · Eat a balanced diet. Include plenty of foods that are high in calcium and iron. ¨ Foods high in calcium include milk, cheese, yogurt, almonds, and broccoli. ¨ Foods high in iron include red meat, shellfish, poultry, eggs, beans, raisins, whole-grain bread, and leafy green vegetables. · Do not smoke. If you need help quitting, talk to your doctor about stop-smoking programs and medicines. These can increase your chances of quitting for good. · Do not drink alcohol or use illegal drugs. · Follow your doctor's directions about activity. Your doctor will let you know how much, if any, exercise you can do. · Ask your doctor if you can have sex. If you are at risk for early labor, your doctor may ask you to not have sex. · Take care to prevent falls. During pregnancy, your joints are loose, and your balance is off. Sports such as bicycling, skiing, or in-line skating can increase your risk of falling. And don't ride horses or motorcycles, dive, water ski, scuba dive, or parachute jump while you are pregnant. · Avoid getting very hot. Do not use saunas or hot tubs. Avoid staying out in the sun in hot weather for long periods. Take acetaminophen (Tylenol) to lower a high fever.  
· Do not take any over-the-counter or herbal medicines or supplements without talking to your doctor or pharmacist first. 
 When should you call for help? Call 911 anytime you think you may need emergency care. For example, call if: 
? · You passed out (lost consciousness). ? · You have severe vaginal bleeding. ? · You have severe pain in your belly or pelvis. ? · You have had fluid gushing or leaking from your vagina and you know or think the umbilical cord is bulging into your vagina. If this happens, immediately get down on your knees so your rear end (buttocks) is higher than your head. This will decrease the pressure on the cord until help arrives. ?Call your doctor now or seek immediate medical care if: 
? · You have signs of preeclampsia, such as: 
¨ Sudden swelling of your face, hands, or feet. ¨ New vision problems (such as dimness or blurring). ¨ A severe headache. ? · You have any vaginal bleeding. ? · You have belly pain or cramping. ? · You have a fever. ? · You have had regular contractions (with or without pain) for an hour. This means that you have 8 or more within 1 hour or 4 or more in 20 minutes after you change your position and drink fluids. ? · You have a sudden release of fluid from your vagina. ? · You have low back pain or pelvic pressure that does not go away. ? · You notice that your baby has stopped moving or is moving much less than normal. ? Watch closely for changes in your health, and be sure to contact your doctor if you have any problems. Where can you learn more? Go to http://alberto-dasha.info/. Enter [id number] in the search box to learn more about \"Pregnancy Precautions: Care Instructions. \" Current as of: March 16, 2017 Content Version: 11.4 © 2281-3039 [employer] ([employer]). Care instructions adapted under license by [employer] (which disclaims liability or warranty for this information).  If you have questions about a medical condition or this instruction, always ask your healthcare professional. [employer] disclaims any warranty or liability for your use of this information. Introducing [facility]! Dear Shraddha Davila: Thank you for requesting a makeena account. Our records indicate that you already have an active makeena account. You can access your account anytime at [email or web address]/[email or web address] Mobile Did you know that you can access your hospital and ER discharge instructions at any time in makeena? You can also review all of your test results from your hospital stay or ER visit. Additional Information If you have questions, please visit the Frequently Asked Questions section of the makeena website at [email or web address] Remember, makeena is NOT to be used for urgent needs. For medical emergencies, dial 911. Now available from your iPhone and Android! Introducing Ash Dye As a [employer] patient, I wanted to make you aware of our electronic visit tool called Ash Dye. [employer] 24/7 allows you to connect within minutes with a medical provider 24 hours a day, seven days a week via a mobile device or tablet or logging into a secure website from your computer. You can access Ash Dye from anywhere in the [place]. A virtual visit might be right for you when you have a simple condition and feel like you just dont want to get out of bed, or cant get away from work for an appointment, when your regular [employer] provider is not available (evenings, weekends or holidays), or when youre out of town and need minor care. Electronic visits cost only $49 and if the [employer] 24/7 provider determines a prescription is needed to treat your condition, one can be electronically transmitted to a nearby pharmacy*. Please take a moment to enroll today if you have not already done so. The enrollment process is free and takes just a few minutes.   To enroll, please download the [employer] 24/7 netta to your tablet or phone, or visit [email or web address] to enroll on your computer. And, as an [address] [place] [address] patient with a [employer] account, the results of your visits will be scanned into your electronic medical record and your primary care provider will be able to view the scanned results. We urge you to continue to see your regular [employer] provider for your ongoing medical care. And while your primary care provider may not be the one available when you seek a [facility] virtual visit, the peace of mind you get from getting a real diagnosis real time can be priceless. For more information on [facility], view our Frequently Asked Questions (FAQs) at [email or web address]. Sincerely, 
 
Xiao Cramer MD 
Chief Medical Officer Khushi Wright *:  certain medications cannot be prescribed via [facility] Providers Seen During Your Hospitalization Provider Specialty Primary office phone Edi Osullivan MD Obstetrics & Gynecology [phone] Your Primary Care Physician (PCP) Primary Care Physician Office Phone Office Fax Suzy Pope [phone] You are allergic to the following Allergen Reactions Latex Hives Adhesive Tape-Silicones Rash Recent Documentation Height BMI OB Status Smoking Status 1.575 m 27.44 kg/m2 Pregnant Never Smoker Emergency Contacts Name Discharge Info Relation Home Work Mobile Rogelio Young  Spouse [3] [phone] Patient Belongings The following personal items are in your possession at time of discharge: 
                             
 
  
  
 Please provide this summary of care documentation to your next provider.  
  
  
 
  
Signatures-by signing, you are acknowledging that this After Visit Summary has been reviewed with you and you have received a copy. Patient Signature:  ____________________________________________________________ Date:  ____________________________________________________________  
  
Jerral [place] Provider Signature:  ____________________________________________________________ Date:  ____________________________________________________________

## 2018-04-12 NOTE — PROGRESS NOTES
04/12/18 1235   Fetal Vital Signs   Mode External   Fetal Heart Rate 130   Fetal Activity Present   Variability 6-25 BPM   Decelerations None   Accelerations Yes   FHR Interpretation Category I   RN Reviewed Strip?  Yes   Non Stress Test Reactive   Uterine Activity   Mode External   Frequency (min) x2 with irr   Duration (sec) 30-80

## 2018-04-12 NOTE — PROGRESS NOTES
Pt sent over by Dr. Boo Brady for extended monitoring. BPP in office was 6/8 off for breathing and NAEL of 32. Orders received.

## 2018-04-12 NOTE — DISCHARGE INSTRUCTIONS
Pregnancy Precautions: Care Instructions  Your Care Instructions    There is no sure way to prevent labor before your due date ( labor) or to prevent most other pregnancy problems. But there are things you can do to increase your chances of a healthy pregnancy. Go to your appointments, follow your doctor's advice, and take good care of yourself. Eat well, and exercise (if your doctor agrees). And make sure to drink plenty of water. Follow-up care is a key part of your treatment and safety. Be sure to make and go to all appointments, and call your doctor if you are having problems. It's also a good idea to know your test results and keep a list of the medicines you take. How can you care for yourself at home? · Make sure you go to your prenatal appointments. At each visit, your doctor will check your blood pressure. Your doctor will also check to see if you have protein in your urine. High blood pressure and protein in urine are signs of preeclampsia. This condition can be dangerous for you and your baby. · Drink plenty of fluids, enough so that your urine is light yellow or clear like water. Dehydration can cause contractions. If you have kidney, heart, or liver disease and have to limit fluids, talk with your doctor before you increase the amount of fluids you drink. · Tell your doctor right away if you notice any symptoms of an infection, such as:  ¨ Burning when you urinate. ¨ A foul-smelling discharge from your vagina. ¨ Vaginal itching. ¨ Unexplained fever. ¨ Unusual pain or soreness in your uterus or lower belly. · Eat a balanced diet. Include plenty of foods that are high in calcium and iron. ¨ Foods high in calcium include milk, cheese, yogurt, almonds, and broccoli. ¨ Foods high in iron include red meat, shellfish, poultry, eggs, beans, raisins, whole-grain bread, and leafy green vegetables. · Do not smoke.  If you need help quitting, talk to your doctor about stop-smoking programs and medicines. These can increase your chances of quitting for good. · Do not drink alcohol or use illegal drugs. · Follow your doctor's directions about activity. Your doctor will let you know how much, if any, exercise you can do. · Ask your doctor if you can have sex. If you are at risk for early labor, your doctor may ask you to not have sex. · Take care to prevent falls. During pregnancy, your joints are loose, and your balance is off. Sports such as bicycling, skiing, or in-line skating can increase your risk of falling. And don't ride horses or motorcycles, dive, water ski, scuba dive, or parachute jump while you are pregnant. · Avoid getting very hot. Do not use saunas or hot tubs. Avoid staying out in the sun in hot weather for long periods. Take acetaminophen (Tylenol) to lower a high fever. · Do not take any over-the-counter or herbal medicines or supplements without talking to your doctor or pharmacist first.  When should you call for help? Call 911 anytime you think you may need emergency care. For example, call if:  ? · You passed out (lost consciousness). ? · You have severe vaginal bleeding. ? · You have severe pain in your belly or pelvis. ? · You have had fluid gushing or leaking from your vagina and you know or think the umbilical cord is bulging into your vagina. If this happens, immediately get down on your knees so your rear end (buttocks) is higher than your head. This will decrease the pressure on the cord until help arrives. ?Call your doctor now or seek immediate medical care if:  ? · You have signs of preeclampsia, such as:  ¨ Sudden swelling of your face, hands, or feet. ¨ New vision problems (such as dimness or blurring). ¨ A severe headache. ? · You have any vaginal bleeding. ? · You have belly pain or cramping. ? · You have a fever. ? · You have had regular contractions (with or without pain) for an hour.  This means that you have 8 or more within 1 hour or 4 or more in 20 minutes after you change your position and drink fluids. ? · You have a sudden release of fluid from your vagina. ? · You have low back pain or pelvic pressure that does not go away. ? · You notice that your baby has stopped moving or is moving much less than normal.   ? Watch closely for changes in your health, and be sure to contact your doctor if you have any problems. Where can you learn more? Go to http://alberto-dasha.info/. Enter [id number] in the search box to learn more about \"Pregnancy Precautions: Care Instructions. \"  Current as of: March 16, 2017  Content Version: 11.4  © 4717-7558 [employer]. Care instructions adapted under license by [employer] (which disclaims liability or warranty for this information). If you have questions about a medical condition or this instruction, always ask your healthcare professional. [address] any warranty or liability for your use of this information.

## 2018-04-12 NOTE — IP AVS SNAPSHOT
[place] [id number] [facility] 
[phone] Patient: Yaw Mendiola 
MRN: [id number] GNY:[birth year] A check ousmane indicates which time of day the medication should be taken. My Medications ASK your doctor about these medications Instructions Each Dose to Equal  
 Morning Noon Evening Bedtime  
 ascorbic acid (vitamin C) 500 mg tablet Commonly known as:  VITAMIN C Your last dose was: Your next dose is: Take 1 Tab by mouth three (3) times daily. 500 mg  
    
   
   
   
  
 calcium carbonate 200 mg calcium (500 mg) Chew Commonly known as:  TUMS Your last dose was: Your next dose is: Take 1 Tab by mouth daily. 1 Tab  
    
   
   
   
  
 ferrous sulfate 325 mg (65 mg iron) tablet Commonly known as:  Iron (Ferrous Sulfate) Your last dose was: Your next dose is: Take 1 Tab by mouth three (3) times daily. 325 mg  
    
   
   
   
  
 * insulin detemir U-100 100 unit/mL (3 mL) Inpn Commonly known as:  LEVEMIR FLEXTOUCH U-100 INSULN Your last dose was: Your next dose is:    
   
   
 6-25 units as directed twice daily; May substitute Lantus * insulin detemir U-100 100 unit/mL (3 mL) Inpn Commonly known as:  LEVEMIR FLEXTOUCH U-100 INSULN Your last dose was: Your next dose is:    
   
   
 6-25 units as directed twice daily; May substitute Lantus  
     
   
   
   
  
 insulin lispro 100 unit/mL kwikpen Commonly known as:  HUMALOG Your last dose was: Your next dose is:    
   
   
 6 to 25 units with meals as directed. * Insulin Needles (Disposable) 32 gauge x 5/32\" Ndle Commonly known as:  Bijal Randolph Your last dose was: Your next dose is:    
   
   
 1 Pen Needle by Does Not Apply route five (5) times daily. 1 Pen Needle * Insulin Needles (Disposable) 32 gauge x 5/32\" Ndle Commonly known as:  Singh Valencia Your last dose was: Your next dose is:    
   
   
 1 Pen Needle by Does Not Apply route five (5) times daily. 1 Pen Needle PEPCID AC 10 mg tablet Generic drug:  famotidine Your last dose was: Your next dose is: Take 10 mg by mouth two (2) times a day. 10 mg PRENATAL GUMMY PO Your last dose was: Your next dose is: Take  by mouth. TYLENOL EXTRA STRENGTH 500 mg tablet Generic drug:  acetaminophen Your last dose was: Your next dose is: Take  by mouth every six (6) hours as needed for Pain. * Notice: This list has 4 medication(s) that are the same as other medications prescribed for you. Read the directions carefully, and ask your doctor or other care provider to review them with you.

## 2018-04-12 NOTE — PROGRESS NOTES
Patient discharged home per MD order. Discharge instructions completed and patient verbalized understanding. Questions encouraged. Accompanied by  and family. DC in stable condition.

## 2018-04-16 PROBLEM — O26.893 ABDOMINAL PAIN IN PREGNANCY, THIRD TRIMESTER: Status: RESOLVED | Noted: 2018-04-03 | Resolved: 2018-04-16

## 2018-04-16 PROBLEM — R10.9 ABDOMINAL PAIN IN PREGNANCY, THIRD TRIMESTER: Status: RESOLVED | Noted: 2018-04-03 | Resolved: 2018-04-16

## 2018-04-16 PROBLEM — Z34.90 PREGNANCY: Status: RESOLVED | Noted: 2018-04-12 | Resolved: 2018-04-16

## 2018-04-23 ENCOUNTER — HOSPITAL ENCOUNTER (INPATIENT)
Age: 33
LOS: 5 days | Discharge: HOME | End: 2018-04-28
Attending: OBSTETRICS & GYNECOLOGY | Admitting: OBSTETRICS & GYNECOLOGY
Payer: COMMERCIAL

## 2018-04-23 ENCOUNTER — ANESTHESIA EVENT (OUTPATIENT)
Dept: LABOR AND DELIVERY | Age: 33
End: 2018-04-23
Payer: COMMERCIAL

## 2018-04-23 DIAGNOSIS — G89.18 POST-OP PAIN: Primary | ICD-10-CM

## 2018-04-23 PROBLEM — Z34.90 PREGNANCY: Status: ACTIVE | Noted: 2018-04-23

## 2018-04-23 PROBLEM — Z34.90 PREGNANCY: Status: RESOLVED | Noted: 2018-04-12 | Resolved: 2018-04-23

## 2018-04-23 PROBLEM — O26.893 ABDOMINAL PAIN IN PREGNANCY, THIRD TRIMESTER: Status: RESOLVED | Noted: 2018-04-03 | Resolved: 2018-04-23

## 2018-04-23 PROBLEM — R10.9 ABDOMINAL PAIN IN PREGNANCY, THIRD TRIMESTER: Status: RESOLVED | Noted: 2018-04-03 | Resolved: 2018-04-23

## 2018-04-23 LAB
ABO + RH BLD: NORMAL
BLOOD GROUP ANTIBODIES SERPL: NORMAL
ERYTHROCYTE [DISTWIDTH] IN BLOOD BY AUTOMATED COUNT: 24 % (ref 11.9–14.6)
GLUCOSE BLD STRIP.AUTO-MCNC: 132 MG/DL (ref 65–100)
GLUCOSE BLD STRIP.AUTO-MCNC: 135 MG/DL (ref 65–100)
GLUCOSE BLD STRIP.AUTO-MCNC: 89 MG/DL (ref 65–100)
HCT VFR BLD AUTO: 34.1 % (ref 35.8–46.3)
HGB BLD-MCNC: 10.8 G/DL (ref 11.7–15.4)
MCH RBC QN AUTO: 28.6 PG (ref 26.1–32.9)
MCHC RBC AUTO-ENTMCNC: 31.7 G/DL (ref 31.4–35)
MCV RBC AUTO: 90.2 FL (ref 79.6–97.8)
PLATELET # BLD AUTO: 226 K/UL (ref 150–450)
PMV BLD AUTO: 10.1 FL (ref 10.8–14.1)
RBC # BLD AUTO: 3.78 M/UL (ref 4.05–5.25)
SPECIMEN EXP DATE BLD: NORMAL
WBC # BLD AUTO: 8.9 K/UL (ref 4.3–11.1)

## 2018-04-23 PROCEDURE — 65270000029 HC RM PRIVATE

## 2018-04-23 PROCEDURE — 86900 BLOOD TYPING SEROLOGIC ABO: CPT | Performed by: OBSTETRICS & GYNECOLOGY

## 2018-04-23 PROCEDURE — 59025 FETAL NON-STRESS TEST: CPT

## 2018-04-23 PROCEDURE — 85027 COMPLETE CBC AUTOMATED: CPT | Performed by: OBSTETRICS & GYNECOLOGY

## 2018-04-23 PROCEDURE — 99218 HC RM OBSERVATION: CPT

## 2018-04-23 PROCEDURE — 74011636637 HC RX REV CODE- 636/637: Performed by: OBSTETRICS & GYNECOLOGY

## 2018-04-23 PROCEDURE — 74011250636 HC RX REV CODE- 250/636: Performed by: OBSTETRICS & GYNECOLOGY

## 2018-04-23 PROCEDURE — 74011250637 HC RX REV CODE- 250/637: Performed by: OBSTETRICS & GYNECOLOGY

## 2018-04-23 PROCEDURE — 82962 GLUCOSE BLOOD TEST: CPT

## 2018-04-23 RX ORDER — DEXTROSE, SODIUM CHLORIDE, SODIUM LACTATE, POTASSIUM CHLORIDE, AND CALCIUM CHLORIDE 5; .6; .31; .03; .02 G/100ML; G/100ML; G/100ML; G/100ML; G/100ML
125 INJECTION, SOLUTION INTRAVENOUS CONTINUOUS
Status: DISCONTINUED | OUTPATIENT
Start: 2018-04-23 | End: 2018-04-24

## 2018-04-23 RX ORDER — SODIUM CHLORIDE, SODIUM LACTATE, POTASSIUM CHLORIDE, CALCIUM CHLORIDE 600; 310; 30; 20 MG/100ML; MG/100ML; MG/100ML; MG/100ML
125 INJECTION, SOLUTION INTRAVENOUS CONTINUOUS
Status: DISCONTINUED | OUTPATIENT
Start: 2018-04-23 | End: 2018-04-24

## 2018-04-23 RX ORDER — FAMOTIDINE 20 MG/1
20 TABLET, FILM COATED ORAL 2 TIMES DAILY
Status: DISCONTINUED | OUTPATIENT
Start: 2018-04-23 | End: 2018-04-24

## 2018-04-23 RX ORDER — INSULIN LISPRO 100 [IU]/ML
10 INJECTION, SOLUTION INTRAVENOUS; SUBCUTANEOUS
Status: COMPLETED | OUTPATIENT
Start: 2018-04-23 | End: 2018-04-23

## 2018-04-23 RX ADMIN — INSULIN LISPRO 10 UNITS: 100 INJECTION, SOLUTION INTRAVENOUS; SUBCUTANEOUS at 18:45

## 2018-04-23 RX ADMIN — SODIUM CHLORIDE, SODIUM LACTATE, POTASSIUM CHLORIDE, AND CALCIUM CHLORIDE 75 ML/HR: 600; 310; 30; 20 INJECTION, SOLUTION INTRAVENOUS at 11:15

## 2018-04-23 RX ADMIN — SODIUM CHLORIDE, SODIUM LACTATE, POTASSIUM CHLORIDE, AND CALCIUM CHLORIDE 75 ML/HR: 600; 310; 30; 20 INJECTION, SOLUTION INTRAVENOUS at 20:47

## 2018-04-23 RX ADMIN — FAMOTIDINE 20 MG: 20 TABLET, FILM COATED ORAL at 19:51

## 2018-04-23 NOTE — PROGRESS NOTES
11:10 Peripheral IV 04/23/18 Right Hand Placed Placement Date/Time: 04/23/18 1110   Number of Attempts: 1  Inserted By: Maylin Payne RN  Size: 18 G  Orientation: Right  Location: Radha Chaudhari RN     Blood drawn and sent to lab for CBC and Type and screen.

## 2018-04-23 NOTE — PROGRESS NOTES
Pt took own post prandial glucose. Result was 109. Pt instructed to call RN when time for BS check for hospital monitoring.

## 2018-04-23 NOTE — PROGRESS NOTES
Results for Ama Black (MRN [id number]) as of 4/23/2018 17:42   Ref. Range 4/23/2018 16:20   GLUCOSE,FAST - POC Latest Ref Range: 65 - 100 mg/dL 135 (H)   Pt states that she is not eating dinner yet only a snack. Will call RN when ready for [facility] dinner glucose to be taken.

## 2018-04-23 NOTE — H&P
High Risk Obstetrics Admission H&P    Joan Helton  [id number]  1985    Chief Complaint:  Pregnancy and GDM, Polyhydramnios,  bpp     HPI: 28 y.o. female  G10 P 1081 at 37w6d with Estimated Date of Delivery: 18 sent from office due to  bpp for extended monitoring. Only had 1 accel on NST in office thereafter, but overall reassuring. Pregnancy has been complicated by insulin controlled A2DM w/ polyhydramnios. Last growth US was 2018 at [facility]:  polyhydramnios. AC 56%, overall 55%, NAEL 30.6 cm (11.1 cm), UA Dopplers WNL, BPP . Although appropriate growth, due to polyhydramnios, MFM agreed delivery anywhere between 38-39wks reasonable. Other  problems include history of previous c/s (for planned repeat), anemia, isolated fetal EIF, and hx recurrent pregnancy loss (all <12wks, no D&C w/ any). Denies lof, vb.  Good fm. Does admit to having periodic painful ctxs. ROS:  A comprehensive review of systems was negative except for that written in the HPI. OB History      Para Term  AB Living    10 1 1 0 8 1    SAB TAB Ectopic Molar Multiple Live Births    7 0 1 0 0 1        Obstetric Comments    All SAB before 12 weeks. No D&C required with any.         Past Medical History:   Diagnosis Date    Anemia in pregnancy, third trimester 3/12/2018    Breast disorder     pt has 3 breast lumps on right side - one is tagged - PCP manages    Cholelithiasis with cholecystitis 2013    Diabetes ([address]     gest    Elevated prolactin level ([address] 4/3/2015    Gallbladder attack     Gestational diabetes     History of bladder infections     Hx of ectopic pregnancy     Infertility, female     Oligomenorrhea     Other ill-defined conditions(799.89)     hx of a ectopic pregnancy 09    Ovarian cyst     PCOS (polycystic ovarian syndrome)     Polycystic disease, ovaries     Previous recurrent miscarriages affecting pregnancy, antepartum      Past Surgical History:   Procedure Laterality Date     DELIVERY ONLY      HX BREAST LUMPECTOMY Right     x3 benign    HX GYN  2009    Right fallopian tube removed with ectopic pregnancy    HX WISDOM TEETH EXTRACTION      LAP,TUBAL CAUTERY       Social History     Social History    Marital status:      Spouse name: N/A    Number of children: N/A    Years of education: N/A     Occupational History    Not on file. Social History Main Topics    Smoking status: Never Smoker    Smokeless tobacco: Never Used    Alcohol use No    Drug use: No    Sexual activity: Yes     Partners: Male     Birth control/ protection: None     Other Topics Concern    Not on file     Social History Narrative     Family History   Problem Relation Age of Onset    Cancer Mother      uterine    Hypertension Mother     Elevated Lipids Father     Cancer Father      Basal cell ca at nose and prostate cancer    Cancer Paternal Grandfather      melanoma    Deep Vein Thrombosis Paternal Grandfather     Colon Cancer Maternal Uncle     Lung Disease Maternal Uncle     Diabetes Maternal Grandmother     Hypertension Maternal Grandmother     Heart Disease Maternal Grandmother      Allergies   Allergen Reactions    Latex Hives    Adhesive Tape-Silicones Rash     Prior to Admission Medications   Prescriptions Last Dose Informant Patient Reported? Taking? Insulin Needles, Disposable, (UNIFINE PENTIPS) 32 gauge x 5/32\" ndle   No No   Si Pen Needle by Does Not Apply route five (5) times daily. PNV62/FA/OM3/DHA/EPA/FISH OIL (PRENATAL GUMMY PO)   Yes No   Sig: Take  by mouth. acetaminophen (TYLENOL EXTRA STRENGTH) 500 mg tablet   Yes No   Sig: Take  by mouth every six (6) hours as needed for Pain. ascorbic acid, vitamin C, (VITAMIN C) 500 mg tablet   No No   Sig: Take 1 Tab by mouth three (3) times daily.    calcium carbonate (TUMS) 200 mg calcium (500 mg) chew   Yes No   Sig: Take 1 Tab by mouth daily. famotidine (PEPCID AC) 10 mg tablet   Yes No   Sig: Take 10 mg by mouth two (2) times a day. ferrous sulfate (IRON, FERROUS SULFATE,) 325 mg (65 mg iron) tablet   No No   Sig: Take 1 Tab by mouth three (3) times daily. insulin detemir (LEVEMIR FLEXTOUCH) 100 unit/mL (3 mL) inpn   No No   Si-25 units as directed twice daily; May substitute Lantus      Facility-Administered Medications: None         Vitals:    Patient Vitals for the past 8 hrs:   BP Temp Pulse Resp   18 1036 105/59 98.3 °F (36.8 °C) 85 18     Temp (24hrs), Av.3 °F (36.8 °C), Min:98.3 °F (36.8 °C), Max:98.3 °F (36.8 °C)             Visit Vitals    /59 (BP 1 Location: Right arm, BP Patient Position: At rest)    Pulse 85    Temp 98.3 °F (36.8 °C)    Resp 18    LMP 2017     Physical Exam:  Constitutional: She appears well-developed and well-nourished. No distress. HENT:    Head: Normocephalic and atraumatic. Lungs: CTAB, effort normal, no rales/crackles  Cardiovascular: RRR, no M/R/G  Abd:  Gravid, no RUQ TTP  Skin: She is not diaphoretic. Psychiatric: She has a normal mood and affect.  Her behavior is normal. Thought content normal.       SVE: soft, cl/25/-4    FHTs:  Cat I, R   Cosmos: 5-12min, irreg          Labs:   Recent Results (from the past 24 hour(s))   GLUCOSE, POC    Collection Time: 18 10:14 AM   Result Value Ref Range    Glucose (POC) 89 65 - 100 mg/dL   CBC W/O DIFF    Collection Time: 18 11:14 AM   Result Value Ref Range    WBC 8.9 4.3 - 11.1 K/uL    RBC 3.78 (L) 4.05 - 5.25 M/uL    HGB 10.8 (L) 11.7 - 15.4 g/dL    HCT 34.1 (L) 35.8 - 46.3 %    MCV 90.2 79.6 - 97.8 FL    MCH 28.6 26.1 - 32.9 PG    MCHC 31.7 31.4 - 35.0 g/dL    RDW 24.0 (H) 11.9 - 14.6 %    PLATELET 972 708 - 486 K/uL    MPV 10.1 (L) 10.8 - 14.1 FL       Assessment and Plan:    34yo [id number] at 37w6d w/ A2DM, poly, and 6/8 bpp:    -extended monitoring then NST tonight   -C/s in am w/ Dr. Bernita Habermann if FHTs remain cat I in attempt to reach 38wks  -C/s today if any concerns   -continue current insulin regimen and fsbg checks  -CBC, T&S       This note will not be viewable in MyChart.       Ashwin Lew MD

## 2018-04-23 NOTE — IP AVS SNAPSHOT
[address] 9[address] [facility] 
[phone] Patient: Ashley Abarca 
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 [place] [address] 
[phone] Discharge Orders None A check ousmane indicates which time of day the medication should be taken. My Medications START taking these medications Instructions Each Dose to Equal  
 Morning Noon Evening Bedtime  
 ibuprofen 800 mg tablet Commonly known as:  MOTRIN Your last dose was: Your next dose is: Take 1 Tab by mouth every eight (8) hours. 800 mg  
    
   
   
   
  
 oxyCODONE IR 5 mg immediate release tablet Commonly known as:  Mckenna Adas Your last dose was: Your next dose is: Take 1-3 Tabs by mouth every six (6) hours as needed. Max Daily Amount: 60 mg. Indications: Pain 5-15 mg CONTINUE taking these medications Instructions Each Dose to Equal  
 Morning Noon Evening Bedtime ascorbic acid (vitamin C) 500 mg tablet Commonly known as:  VITAMIN C Your last dose was: Your next dose is: Take 1 Tab by mouth three (3) times daily. 500 mg  
    
   
   
   
  
 calcium carbonate 200 mg calcium (500 mg) Chew Commonly known as:  TUMS Your last dose was: Your next dose is: Take 1 Tab by mouth daily. 1 Tab PEPCID AC 10 mg tablet Generic drug:  famotidine Your last dose was: Your next dose is: Take 10 mg by mouth two (2) times a day. 10 mg PRENATAL GUMMY PO Your last dose was: Your next dose is: Take  by mouth. TYLENOL EXTRA STRENGTH 500 mg tablet Generic drug:  acetaminophen Your last dose was: Your next dose is: Take  by mouth every six (6) hours as needed for Pain. STOP taking these medications   
 ferrous sulfate 325 mg (65 mg iron) tablet Commonly known as:  Iron (Ferrous Sulfate)  
   
  
 insulin detemir U-100 100 unit/mL (3 mL) Inpn Commonly known as:  LEVEMIR FLEXTOUCH U-100 INSULN Insulin Needles (Disposable) 32 gauge x 5/32\" Ndle Commonly known as:  Neo Dickinson Where to Get Your Medications These medications were [id number] [address] [facility] [place], [address] [place] [address] [facility] [address] Phone:  [phone]  
  ibuprofen 800 mg tablet Information on where to get these meds will be given to you by the nurse or doctor. ! Ask your nurse or doctor about these medications  
  oxyCODONE IR 5 mg immediate release tablet Opioid Education  Prescription Opioids: What You Need to Know: 
 
Prescription opioids can be used to help relieve moderate-to-severe pain and are often prescribed following a surgery or injury, or for certain health conditions. These medications can be an important part of treatment but also come with serious risks. Opioids are strong pain medicines. Examples include hydrocodone, oxycodone, fentanyl, and morphine. Heroin is an example of an illegal opioid. It is important to work with your health care provider to make sure you are getting the safest, most effective care. WHAT ARE THE RISKS AND SIDE EFFECTS OF OPIOID USE? Prescription opioids carry serious risks of addiction and overdose, especially with prolonged use. An opioid overdose, often marked by slow breathing, can cause sudden death. The use of prescription opioids can have a number of side effects as well, even when taken as directed. · Tolerance-meaning you might need to take more of a medication for the same pain relief · Physical dependence-meaning you have symptoms of withdrawal when the medication is stopped. Withdrawal symptoms can include nausea, sweating, chills, diarrhea, stomach cramps, and muscle aches. Withdrawal can last up to several weeks, depending on which drug you took and how long you took it. · Increased sensitivity to pain · Constipation · Nausea, vomiting, and dry mouth · Sleepiness and dizziness · Confusion · Depression · Low levels of testosterone that can result in lower sex drive, energy, and strength · Itching and sweating RISKS ARE GREATER WITH:      
· History of drug misuse, substance use disorder, or overdose · Mental health conditions (such as depression or anxiety) · Sleep apnea · Older age (72 years or older) · Pregnancy Avoid alcohol while taking prescription opioids. Also, unless specifically advised by your health care provider, medications to avoid include: · Benzodiazepines (such as Xanax or Valium) · Muscle relaxants (such as Soma or Flexeril) · Hypnotics (such as Ambien or Lunesta) · Other prescription opioids KNOW YOUR OPTIONS Talk to your health care provider about ways to manage your pain that don't involve prescription opioids. Some of these options may actually work better and have fewer risks and side effects. Options may include: 
· Pain relievers such as acetaminophen, ibuprofen, and naproxen · Some medications that are also used for depression or seizures · Physical therapy and exercise · Counseling to help patients learn how to cope better with triggers of pain and stress. · Application of heat or cold compress · Massage therapy · Relaxation techniques Be Informed Make sure you know the name of your medication, how much and how often to take it, and its potential risks & side effects. IF YOU ARE PRESCRIBED OPIOIDS FOR PAIN: 
· Never take opioids in greater amounts or more often than prescribed. Remember the goal is not to be pain-free but to manage your pain at a tolerable level. · Follow up with your primary care provider to: · Work together to create a plan on how to manage your pain. · Talk about ways to help manage your pain that don't involve prescription opioids. · Talk about any and all concerns and side effects. · Help prevent misuse and abuse. · Never sell or share prescription opioids · Help prevent misuse and abuse. · Store prescription opioids in a secure place and out of reach of others (this may include visitors, children, friends, and family). · Safely dispose of unused/unwanted prescription opioids: Find your community drug take-back program or your pharmacy mail-back program, or flush them down the toilet, following guidance from the Food and Drug Administration (www.fda.gov/Drugs/ResourcesForYou). · Visit www.cdc.gov/drugoverdose to learn about the risks of opioid abuse and overdose. · If you believe you may be struggling with addiction, tell your health care provider and ask for guidance or call [employer] at 8-967-677-RLRE. Discharge Instructions * Follow-up Care/Patient Instructions: Activity: Activity as tolerated, No lifting, Driving, or Strenuous exercise for 2-4 weeks, No driving for 2 weeks and No sex for 6 weeks Diet: Diabetic Diet Wound Care: As directed 
Brice instruction to follow: Activity: Pelvis rest for 6 weeks No heavy lifting over 15 lbs for 2 weeks No driving for 2 weeks No push/pull motion such as sweeping or vacuuming for 2 weeks No tub baths for 6 weeks  section keep incision clean and dry, may shower as normal with soap and water. Inspect incision every day for signs of infection listed below. Continue to use luis felipe-bottle with every void or bowel movement until comfortable stopping. Change sanitary pad after each urination or bowel movement. Call MD for the following: 
    Fever over 101 F; pain not relieved by medication; foul smelling vaginal discharge or increase in vaginal bleeding. Redness, swelling, or drainage from  incision. Take medication as prescribed. Follow up with MD as order.  Section: What to Expect at [facility] Your Recovery A  section, or , is surgery to deliver your baby through a cut, called an incision, that the doctor makes in your lower belly and uterus. You may have some pain in your lower belly and need pain medicine for 1 to 2 weeks. You can expect some vaginal bleeding for several weeks. You will probably need about 6 weeks to fully recover. It is important to take it easy while the incision is healing. Avoid heavy lifting, strenuous activities, or exercises that strain the belly muscles while you are recovering. Ask a family member or friend for help with housework, cooking, and shopping. This care sheet gives you a general idea about how long it will take for you to recover. But each person recovers at a different pace.  Follow the steps below to get better as quickly as possible. How can you care for yourself at home? Activity ? · Rest when you feel tired. Getting enough sleep will help you recover. ? · Try to walk each day. Start by walking a little more than you did the day before. Bit by bit, increase the amount you walk. Walking boosts blood flow and helps prevent pneumonia, constipation, and blood clots. ? · Avoid strenuous activities, such as bicycle riding, jogging, weightlifting, and aerobic exercise, for 6 weeks or until your doctor says it is okay. ? · Until your doctor says it is okay, do not lift anything heavier than your baby. ? · Do not do sit-ups or other exercises that strain the belly muscles for 6 weeks or until your doctor says it is okay. ? · Hold a pillow over your incision when you cough or take deep breaths. This will support your belly and decrease your pain. ? · You may shower as usual. Pat the incision dry when you are done. ? · You will have some vaginal bleeding. Wear sanitary pads. Do not douche or use tampons until your doctor says it is okay. ? · Ask your doctor when you can drive again. ? · You will probably need to take at least 6 weeks off work. It depends on the type of work you do and how you feel. ? · Ask your doctor when it is okay for you to have sex. Diet ? · You can eat your normal diet. If your stomach is upset, try bland, low-fat foods like plain rice, broiled chicken, toast, and yogurt. ? · Drink plenty of fluids (unless your doctor tells you not to). ? · You may notice that your bowel movements are not regular right after your surgery. This is common. Try to avoid constipation and straining with bowel movements. You may want to take a fiber supplement every day. If you have not had a bowel movement after a couple of days, ask your doctor about taking a mild laxative. ? · If you are breastfeeding, do not drink any alcohol. Medicines ? · Your doctor will tell you if and when you can restart your medicines. He or she will also give you instructions about taking any new medicines. ? · If you take blood thinners, such as warfarin (Coumadin), clopidogrel (Plavix), or aspirin, be sure to talk to your doctor. He or she will tell you if and when to start taking those medicines again. Make sure that you understand exactly what your doctor wants you to do. ? · Take pain medicines exactly as directed. ¨ If the doctor gave you a prescription medicine for pain, take it as prescribed. ¨ If you are not taking a prescription pain medicine, ask your doctor if you can take an over-the-counter medicine. ? · If you think your pain medicine is making you sick to your stomach: 
¨ Take your medicine after meals (unless your doctor has told you not to). ¨ Ask your doctor for a different pain medicine. ? · If your doctor prescribed antibiotics, take them as directed. Do not stop taking them just because you feel better. You need to take the full course of antibiotics. Incision care ? · If you have strips of tape on the incision, leave the tape on for a week or until it falls off.  
? · Wash the area daily with warm, soapy water, and pat it dry. Don't use hydrogen peroxide or alcohol, which can slow healing. You may cover the area with a gauze bandage if it weeps or rubs against clothing. Change the bandage every day. ? · Keep the area clean and dry. Other instructions ? · If you breastfeed your baby, you may be more comfortable while you are healing if you place the baby so that he or she is not resting on your belly. Try tucking your baby under your arm, with his or her body along the side you will be feeding on. Support your baby's upper body with your arm. With that hand you can control your baby's head to bring his or her mouth to your breast. This is sometimes called the football hold. Follow-up care is a key part of your treatment and safety. Be sure to make and go to all appointments, and call your doctor if you are having problems. It's also a good idea to know your test results and keep a list of the medicines you take. When should you call for help? Call 911 anytime you think you may need emergency care. For example, call if: 
? · You passed out (lost consciousness). ? · You have chest pain, are short of breath, or cough up blood. ?Call your doctor now or seek immediate medical care if: 
? · You have pain that does not get better after you take pain medicine. ? · You have severe vaginal bleeding. ? · You are dizzy or lightheaded, or you feel like you may faint. ? · You have new or worse pain in your belly or pelvis. ? · You have loose stitches, or your incision comes open. ? · You have symptoms of infection, such as: 
¨ Increased pain, swelling, warmth, or redness. ¨ Red streaks leading from the incision. ¨ Pus draining from the incision. ¨ A fever. ? · You have symptoms of a blood clot in your leg (called a deep vein thrombosis), such as: 
¨ Pain in your calf, back of the knee, thigh, or groin. ¨ Redness and swelling in your leg or groin. ? Watch closely for changes in your health, and be sure to contact your doctor if: 
? · You do not get better as expected. Where can you learn more? Go to http://alberto-dasha.info/. Enter M806 in the search box to learn more about \" Section: What to Expect at Home. \" Current as of: 2017 Content Version: 11.4 © 3929-2973 [employer]. Care instructions adapted under license by [employer] (which disclaims liability or warranty for this information). If you have questions about a medical condition or this instruction, always ask your healthcare professional. [address] any warranty or liability for your use of this information. After Your Delivery (the Postpartum Period): Care Instructions Your Care Instructions Congratulations on the birth of your baby. Like pregnancy, the  period can be a time of excitement, duc, and exhaustion. You may look at your wondrous little baby and feel happy. You may also be overwhelmed by your new sleep hours and new responsibilities. At first, babies often sleep during the days and are awake at night. They do not have a pattern or routine. They may make sudden gasps, jerk themselves awake, or look like they have crossed eyes. These are all normal, and they may even make you smile. In these first weeks after delivery, try to take good care of yourself. It may take 4 to 6 weeks to feel like yourself again, and possibly longer if you had a  birth. You will likely feel very tired for several weeks. Your days will be full of ups and downs, but lots of duc as well. Follow-up care is a key part of your treatment and safety. Be sure to make and go to all appointments, and call your doctor if you are having problems. It's also a good idea to know your test results and keep a list of the medicines you take. How can you care for yourself at home? Take care of your body after delivery · Use pads instead of tampons for the bloody flow that may last as long as 2 weeks. · Ease cramps with ibuprofen (Advil, Motrin). · Ease soreness of hemorrhoids and the area between your vagina and rectum with ice compresses or witch hazel pads. · Ease constipation by drinking lots of fluid and eating high-fiber foods. Ask your doctor about over-the-counter stool softeners. · Cleanse yourself with a gentle squeeze of warm water from a bottle instead of wiping with toilet paper. · Take a sitz bath in warm water several times a day. · Wear a good nursing bra. Ease sore and swollen breasts with warm, wet washcloths. · If you are not breastfeeding, use ice rather than heat for breast soreness. · Your period may not start for several months if you are breastfeeding. You may bleed more, and longer at first, than you did before you got pregnant. · Wait until you are healed (about 4 to 6 weeks) before you have sexual intercourse. Your doctor will tell you when it is okay to have sex. · Try not to travel with your baby for 5 or 6 weeks. If you take a long car trip, make frequent stops to walk around and stretch. Avoid exhaustion · Rest every day. Try to nap when your baby naps. · Ask another adult to be with you for a few days after delivery. · Plan for  if you have other children. · Stay flexible so you can eat at odd hours and sleep when you need to. Both you and your baby are making new schedules. · Plan small trips to get out of the house. Change can make you feel less tired. · Ask for help with housework, cooking, and shopping. Remind yourself that your job is to care for your baby. Know about help for postpartum depression · \"Baby blues\" are common for the first 1 to 2 weeks after birth. You may cry or feel sad or irritable for no reason. · Rest whenever you can. Being tired makes it harder to handle your emotions. · Go for walks with your baby. · Talk to your partner, friends, and family about your feelings. · If your symptoms last for more than a few weeks, or if you feel very depressed, ask your doctor for help. · Postpartum depression can be treated. Support groups and counseling can help. Sometimes medicine can also help. Stay healthy · Eat healthy foods so you have more energy, make good breast milk, and lose extra baby pounds. · If you breastfeed, avoid alcohol and drugs. Stay smoke-free. If you quit during pregnancy, congratulations. · Start daily exercise after 4 to 6 weeks, but rest when you feel tired. · Learn exercises to tone your belly. Do Kegel exercises to regain strength in your pelvic muscles. You can do these exercises while you stand or sit. ¨ Squeeze the same muscles you would use to stop your urine. Your belly and thighs should not move. ¨ Hold the squeeze for 3 seconds, and then relax for 3 seconds. ¨ Start with 3 seconds. Then add 1 second each week until you are able to squeeze for 10 seconds. ¨ Repeat the exercise 10 to 15 times for each session. Do three or more sessions each day. · Find a class for new mothers and new babies that has an exercise time. · If you had a  birth, give yourself a bit more time before you exercise, and be careful. When should you call for help? Call 911 anytime you think you may need emergency care. For example, call if: 
? · You passed out (lost consciousness). ?Call your doctor now or seek immediate medical care if: 
? · You have severe vaginal bleeding. This means you are passing blood clots and soaking through a pad each hour for 2 or more hours. ? · You are dizzy or lightheaded, or you feel like you may faint. ? · You have a fever. ? · You have new belly pain, or your pain gets worse. ? Watch closely for changes in your health, and be sure to contact your doctor if: 
? · Your vaginal bleeding seems to be getting heavier. ? · You have new or worse vaginal discharge. ? · You feel sad, anxious, or hopeless for more than a few days. ? · You do not get better as expected. Where can you learn more? Go to http://alberto-dasha.info/. Enter A461 in the search box to learn more about \"After Your Delivery (the Postpartum Period): Care Instructions. \" Current as of: 2017 Content Version: 11.4 © 9292-9467 [employer]. Care instructions adapted under license by [employer] (which disclaims liability or warranty for this information). If you have questions about a medical condition or this instruction, always ask your healthcare professional. [address] any warranty or liability for your use of this information. Emu Messenger Announcement We are excited to announce that we are making your provider's discharge notes available to you in Emu Messenger. You will see these notes when they are completed and signed by the physician that discharged you from your recent hospital stay. If you have any questions or concerns about any information you see in Emu Messenger, please call the Health Information Department where you were seen or reach out to your Primary Care Provider for more information about your plan of care. Introducing [facility]! Dear Katey Oglesbyer: Thank you for requesting a Emu Messenger account. Our records indicate that you already have an active Emu Messenger account. You can access your account anytime at [email or web address] Did you know that you can access your hospital and ER discharge instructions at any time in Emu Messenger? You can also review all of your test results from your hospital stay or ER visit. Additional Information If you have questions, please visit the Frequently Asked Questions section of the Emu Messenger website at [email or web address] Remember, Emu Messenger is NOT to be used for urgent needs. For medical emergencies, dial 911. Now available from your iPhone and Android! Introducing Ash Dye As a [facility] patient, I wanted to make you aware of our electronic visit tool called Ash Quintinjose. [facility] 24/7 allows you to connect within minutes with a medical provider 24 hours a day, seven days a week via a mobile device or tablet or logging into a secure website from your computer. You can access Ash Dye from anywhere in the [place].  
 
A virtual visit might be right for you when you have a simple condition and feel like you just dont want to get out of bed, or cant get away from work for an appointment, when your regular [facility] provider is not available (evenings, weekends or holidays), or when youre out of town and need minor care. Electronic visits cost only $49 and if the [employer] 24/7 provider determines a prescription is needed to treat your condition, one can be electronically transmitted to a nearby pharmacy*. Please take a moment to enroll today if you have not already done so. The enrollment process is free and takes just a few minutes. To enroll, please download the [employer] 24/7 netta to your tablet or phone, or visit [email or web address] to enroll on your computer. And, as an [address] [place] [address] patient with a [facility] account, the results of your visits will be scanned into your electronic medical record and your primary care provider will be able to view the scanned results. We urge you to continue to see your regular [employer] provider for your ongoing medical care. And while your primary care provider may not be the one available when you seek a [employer] virtual visit, the peace of mind you get from getting a real diagnosis real time can be priceless. For more information on [employer], view our Frequently Asked Questions (FAQs) at [email or web address]. Sincerely, 
 
Carolann Doss MD 
Chief Medical Officer [facility] Rosemary Wright *:  certain medications cannot be prescribed via [email or web address] Providers Seen During Your Hospitalization Provider Specialty Primary office phone My Patel MD Obstetrics & Gynecology [phone] Immunizations Administered for This Admission Name Date Tdap  Deferred () Your Primary Care Physician (PCP) Primary Care Physician Office Phone Office Fax Lakeisha Gonzalez [phone] You are allergic to the following Allergen Reactions Latex Hives Adhesive Tape-Silicones Rash Recent Documentation Breastfeeding? OB Status Smoking Status Unknown Recent pregnancy Never Smoker Emergency Contacts Name Discharge Info Relation Home Work Mobile Rogelio Young  Spouse [3] [phone] Patient Belongings The following personal items are in your possession at time of discharge: 
  Dental Appliances: None  Visual Aid: None      Home Medications: None   Jewelry: None  Clothing: At bedside, Footwear, Pants, Shirt    Other Valuables: None Please provide this summary of care documentation to your next provider. Signatures-by signing, you are acknowledging that this After Visit Summary has been reviewed with you and you have received a copy. Patient Signature:  ____________________________________________________________ Date:  ____________________________________________________________  
  
[facility] Provider Signature:  ____________________________________________________________ Date:  ____________________________________________________________

## 2018-04-24 ENCOUNTER — ANESTHESIA (OUTPATIENT)
Dept: LABOR AND DELIVERY | Age: 33
End: 2018-04-24
Payer: COMMERCIAL

## 2018-04-24 LAB
BASE DEFICIT BLDCOA-SCNC: 0.7 MMOL/L (ref 0–2)
BASE EXCESS BLDCOV CALC-SCNC: 0 MMOL/L (ref 1.9–7.7)
BDY SITE: ABNORMAL
BDY SITE: ABNORMAL
GLUCOSE BLD STRIP.AUTO-MCNC: 100 MG/DL (ref 65–100)
GLUCOSE BLD STRIP.AUTO-MCNC: 83 MG/DL (ref 65–100)
GLUCOSE BLD STRIP.AUTO-MCNC: 84 MG/DL (ref 65–100)
GLUCOSE BLD STRIP.AUTO-MCNC: 85 MG/DL (ref 65–100)
GLUCOSE BLD STRIP.AUTO-MCNC: 89 MG/DL (ref 65–100)
HCO3 BLDCOA-SCNC: 26 MMOL/L (ref 22–26)
HCO3 BLDV-SCNC: 25 MMOL/L
PCO2 BLDCOA: 50 MMHG (ref 33–49)
PCO2 BLDCOV: 40 MMHG (ref 14.1–43.3)
PH BLDCOA: 7.33 [PH] (ref 7.21–7.31)
PH BLDCOV: 7.41 [PH] (ref 7.2–7.44)
PO2 BLDCOA: 14 MMHG (ref 9–19)
PO2 BLDV: 22 MMHG (ref 30.4–57.2)
SERVICE CMNT-IMP: ABNORMAL
SERVICE CMNT-IMP: ABNORMAL

## 2018-04-24 PROCEDURE — 82803 BLOOD GASES ANY COMBINATION: CPT

## 2018-04-24 PROCEDURE — 74011000250 HC RX REV CODE- 250

## 2018-04-24 PROCEDURE — 77030011943: Performed by: OBSTETRICS & GYNECOLOGY

## 2018-04-24 PROCEDURE — 77030002933 HC SUT MCRYL J&J -A: Performed by: OBSTETRICS & GYNECOLOGY

## 2018-04-24 PROCEDURE — 76060000078 HC EPIDURAL ANESTHESIA: Performed by: OBSTETRICS & GYNECOLOGY

## 2018-04-24 PROCEDURE — 74011250636 HC RX REV CODE- 250/636: Performed by: OBSTETRICS & GYNECOLOGY

## 2018-04-24 PROCEDURE — 76010000392 HC C SECN EA ADDL 0.5 HR: Performed by: OBSTETRICS & GYNECOLOGY

## 2018-04-24 PROCEDURE — 77030018846 HC SOL IRR STRL H20 ICUM -A: Performed by: OBSTETRICS & GYNECOLOGY

## 2018-04-24 PROCEDURE — 77030018836 HC SOL IRR NACL ICUM -A: Performed by: OBSTETRICS & GYNECOLOGY

## 2018-04-24 PROCEDURE — 76010000391 HC C SECN FIRST 1 HR: Performed by: OBSTETRICS & GYNECOLOGY

## 2018-04-24 PROCEDURE — 74011250636 HC RX REV CODE- 250/636: Performed by: ANESTHESIOLOGY

## 2018-04-24 PROCEDURE — 77030031139 HC SUT VCRL2 J&J -A: Performed by: OBSTETRICS & GYNECOLOGY

## 2018-04-24 PROCEDURE — 65270000029 HC RM PRIVATE

## 2018-04-24 PROCEDURE — 75410000003 HC RECOV DEL/VAG/CSECN EA 0.5 HR: Performed by: OBSTETRICS & GYNECOLOGY

## 2018-04-24 PROCEDURE — 74011250637 HC RX REV CODE- 250/637: Performed by: ANESTHESIOLOGY

## 2018-04-24 PROCEDURE — 74011250636 HC RX REV CODE- 250/636

## 2018-04-24 PROCEDURE — 59025 FETAL NON-STRESS TEST: CPT

## 2018-04-24 PROCEDURE — 77030014007 HC SPNG HEMSTAT J&J -B: Performed by: OBSTETRICS & GYNECOLOGY

## 2018-04-24 PROCEDURE — 82962 GLUCOSE BLOOD TEST: CPT

## 2018-04-24 PROCEDURE — 4A1HXCZ MONITORING OF PRODUCTS OF CONCEPTION, CARDIAC RATE, EXTERNAL APPROACH: ICD-10-PCS | Performed by: OBSTETRICS & GYNECOLOGY

## 2018-04-24 PROCEDURE — 77030007880 HC KT SPN EPDRL BBMI -B: Performed by: ANESTHESIOLOGY

## 2018-04-24 PROCEDURE — 77030011640 HC PAD GRND REM COVD -A: Performed by: OBSTETRICS & GYNECOLOGY

## 2018-04-24 PROCEDURE — 77030002974 HC SUT PLN J&J -A: Performed by: OBSTETRICS & GYNECOLOGY

## 2018-04-24 PROCEDURE — 77030032490 HC SLV COMPR SCD KNE COVD -B: Performed by: OBSTETRICS & GYNECOLOGY

## 2018-04-24 PROCEDURE — 74011000250 HC RX REV CODE- 250: Performed by: ANESTHESIOLOGY

## 2018-04-24 PROCEDURE — 77030003665 HC NDL SPN BBMI -A: Performed by: ANESTHESIOLOGY

## 2018-04-24 RX ORDER — ACETAMINOPHEN 10 MG/ML
INJECTION, SOLUTION INTRAVENOUS AS NEEDED
Status: DISCONTINUED | OUTPATIENT
Start: 2018-04-24 | End: 2018-04-24

## 2018-04-24 RX ORDER — BUPIVACAINE HYDROCHLORIDE 7.5 MG/ML
INJECTION, SOLUTION INTRASPINAL AS NEEDED
Status: DISCONTINUED | OUTPATIENT
Start: 2018-04-24 | End: 2018-04-24 | Stop reason: HOSPADM

## 2018-04-24 RX ORDER — DEXAMETHASONE SODIUM PHOSPHATE 4 MG/ML
INJECTION, SOLUTION INTRA-ARTICULAR; INTRALESIONAL; INTRAMUSCULAR; INTRAVENOUS; SOFT TISSUE AS NEEDED
Status: DISCONTINUED | OUTPATIENT
Start: 2018-04-24 | End: 2018-04-24 | Stop reason: HOSPADM

## 2018-04-24 RX ORDER — MORPHINE SULFATE 0.5 MG/ML
INJECTION, SOLUTION EPIDURAL; INTRATHECAL; INTRAVENOUS AS NEEDED
Status: DISCONTINUED | OUTPATIENT
Start: 2018-04-24 | End: 2018-04-24 | Stop reason: HOSPADM

## 2018-04-24 RX ORDER — CEFAZOLIN SODIUM IN 0.9 % NACL 2 G/100 ML
PLASTIC BAG, INJECTION (ML) INTRAVENOUS AS NEEDED
Status: DISCONTINUED | OUTPATIENT
Start: 2018-04-24 | End: 2018-04-24 | Stop reason: HOSPADM

## 2018-04-24 RX ORDER — OXYCODONE HYDROCHLORIDE 5 MG/1
15 TABLET ORAL
Status: DISCONTINUED | OUTPATIENT
Start: 2018-04-24 | End: 2018-04-27

## 2018-04-24 RX ORDER — NALBUPHINE HYDROCHLORIDE 10 MG/ML
5 INJECTION, SOLUTION INTRAMUSCULAR; INTRAVENOUS; SUBCUTANEOUS
Status: DISCONTINUED | OUTPATIENT
Start: 2018-04-24 | End: 2018-04-25 | Stop reason: ALTCHOICE

## 2018-04-24 RX ORDER — FENTANYL CITRATE 50 UG/ML
INJECTION, SOLUTION INTRAMUSCULAR; INTRAVENOUS AS NEEDED
Status: DISCONTINUED | OUTPATIENT
Start: 2018-04-24 | End: 2018-04-24 | Stop reason: HOSPADM

## 2018-04-24 RX ORDER — FAMOTIDINE 20 MG/1
20 TABLET, FILM COATED ORAL ONCE
Status: COMPLETED | OUTPATIENT
Start: 2018-04-24 | End: 2018-04-24

## 2018-04-24 RX ORDER — METHYLERGONOVINE MALEATE 0.2 MG/ML
INJECTION INTRAVENOUS
Status: COMPLETED
Start: 2018-04-24 | End: 2018-04-24

## 2018-04-24 RX ORDER — EPHEDRINE SULFATE 50 MG/ML
INJECTION, SOLUTION INTRAVENOUS AS NEEDED
Status: DISCONTINUED | OUTPATIENT
Start: 2018-04-24 | End: 2018-04-24 | Stop reason: HOSPADM

## 2018-04-24 RX ORDER — ACETAMINOPHEN 500 MG
1000 TABLET ORAL EVERY 6 HOURS
Status: COMPLETED | OUTPATIENT
Start: 2018-04-24 | End: 2018-04-25

## 2018-04-24 RX ORDER — HYDROMORPHONE HYDROCHLORIDE 2 MG/ML
0.5 INJECTION, SOLUTION INTRAMUSCULAR; INTRAVENOUS; SUBCUTANEOUS
Status: DISCONTINUED | OUTPATIENT
Start: 2018-04-24 | End: 2018-04-25 | Stop reason: ALTCHOICE

## 2018-04-24 RX ORDER — OXYTOCIN/RINGER'S LACTATE 30/500 ML
PLASTIC BAG, INJECTION (ML) INTRAVENOUS
Status: DISCONTINUED | OUTPATIENT
Start: 2018-04-24 | End: 2018-04-24 | Stop reason: HOSPADM

## 2018-04-24 RX ORDER — NALOXONE HYDROCHLORIDE 0.4 MG/ML
0.2 INJECTION, SOLUTION INTRAMUSCULAR; INTRAVENOUS; SUBCUTANEOUS
Status: DISCONTINUED | OUTPATIENT
Start: 2018-04-24 | End: 2018-04-25 | Stop reason: ALTCHOICE

## 2018-04-24 RX ORDER — KETOROLAC TROMETHAMINE 30 MG/ML
30 INJECTION, SOLUTION INTRAMUSCULAR; INTRAVENOUS
Status: DISCONTINUED | OUTPATIENT
Start: 2018-04-24 | End: 2018-04-24

## 2018-04-24 RX ORDER — TRISODIUM CITRATE DIHYDRATE AND CITRIC ACID MONOHYDRATE 500; 334 MG/5ML; MG/5ML
30 SOLUTION ORAL ONCE
Status: COMPLETED | OUTPATIENT
Start: 2018-04-24 | End: 2018-04-24

## 2018-04-24 RX ORDER — OXYTOCIN/RINGER'S LACTATE 30/500 ML
250 PLASTIC BAG, INJECTION (ML) INTRAVENOUS ONCE
Status: DISCONTINUED | OUTPATIENT
Start: 2018-04-24 | End: 2018-04-24

## 2018-04-24 RX ORDER — METHYLERGONOVINE MALEATE 0.2 MG/ML
0.2 INJECTION INTRAVENOUS ONCE
Status: COMPLETED | OUTPATIENT
Start: 2018-04-24 | End: 2018-04-24

## 2018-04-24 RX ORDER — SODIUM CHLORIDE, SODIUM LACTATE, POTASSIUM CHLORIDE, CALCIUM CHLORIDE 600; 310; 30; 20 MG/100ML; MG/100ML; MG/100ML; MG/100ML
75 INJECTION, SOLUTION INTRAVENOUS CONTINUOUS
Status: DISCONTINUED | OUTPATIENT
Start: 2018-04-24 | End: 2018-04-25 | Stop reason: ALTCHOICE

## 2018-04-24 RX ORDER — SODIUM CHLORIDE, SODIUM LACTATE, POTASSIUM CHLORIDE, CALCIUM CHLORIDE 600; 310; 30; 20 MG/100ML; MG/100ML; MG/100ML; MG/100ML
INJECTION, SOLUTION INTRAVENOUS
Status: DISCONTINUED | OUTPATIENT
Start: 2018-04-24 | End: 2018-04-24 | Stop reason: HOSPADM

## 2018-04-24 RX ORDER — CEFAZOLIN SODIUM/WATER 2 G/20 ML
2 SYRINGE (ML) INTRAVENOUS ONCE
Status: DISCONTINUED | OUTPATIENT
Start: 2018-04-24 | End: 2018-04-24

## 2018-04-24 RX ORDER — SODIUM CHLORIDE, SODIUM LACTATE, POTASSIUM CHLORIDE, CALCIUM CHLORIDE 600; 310; 30; 20 MG/100ML; MG/100ML; MG/100ML; MG/100ML
2000 INJECTION, SOLUTION INTRAVENOUS CONTINUOUS
Status: DISCONTINUED | OUTPATIENT
Start: 2018-04-24 | End: 2018-04-24

## 2018-04-24 RX ORDER — ONDANSETRON 2 MG/ML
INJECTION INTRAMUSCULAR; INTRAVENOUS AS NEEDED
Status: DISCONTINUED | OUTPATIENT
Start: 2018-04-24 | End: 2018-04-24 | Stop reason: HOSPADM

## 2018-04-24 RX ORDER — OXYCODONE HYDROCHLORIDE 5 MG/1
5 TABLET ORAL
Status: DISCONTINUED | OUTPATIENT
Start: 2018-04-24 | End: 2018-04-25 | Stop reason: ALTCHOICE

## 2018-04-24 RX ADMIN — EPHEDRINE SULFATE 25 MG: 50 INJECTION, SOLUTION INTRAVENOUS at 14:58

## 2018-04-24 RX ADMIN — SODIUM CITRATE AND CITRIC ACID MONOHYDRATE 30 ML: 500; 334 SOLUTION ORAL at 13:44

## 2018-04-24 RX ADMIN — SODIUM CHLORIDE, SODIUM LACTATE, POTASSIUM CHLORIDE, AND CALCIUM CHLORIDE 125 ML/HR: 600; 310; 30; 20 INJECTION, SOLUTION INTRAVENOUS at 09:37

## 2018-04-24 RX ADMIN — FENTANYL CITRATE 25 MCG: 50 INJECTION, SOLUTION INTRAMUSCULAR; INTRAVENOUS at 15:21

## 2018-04-24 RX ADMIN — METHYLERGONOVINE MALEATE 0.2 MG: 0.2 INJECTION, SOLUTION INTRAMUSCULAR; INTRAVENOUS at 22:33

## 2018-04-24 RX ADMIN — SODIUM CHLORIDE, SODIUM LACTATE, POTASSIUM CHLORIDE, CALCIUM CHLORIDE: 600; 310; 30; 20 INJECTION, SOLUTION INTRAVENOUS at 15:14

## 2018-04-24 RX ADMIN — SODIUM CHLORIDE 6.25 MG: 9 INJECTION INTRAMUSCULAR; INTRAVENOUS; SUBCUTANEOUS at 23:01

## 2018-04-24 RX ADMIN — SODIUM CHLORIDE, SODIUM LACTATE, POTASSIUM CHLORIDE, AND CALCIUM CHLORIDE 2000 ML/HR: 600; 310; 30; 20 INJECTION, SOLUTION INTRAVENOUS at 13:50

## 2018-04-24 RX ADMIN — OXYCODONE HYDROCHLORIDE 5 MG: 5 TABLET ORAL at 19:51

## 2018-04-24 RX ADMIN — FENTANYL CITRATE 35 MCG: 50 INJECTION, SOLUTION INTRAMUSCULAR; INTRAVENOUS at 15:12

## 2018-04-24 RX ADMIN — ACETAMINOPHEN 1000 MG: 500 TABLET, FILM COATED ORAL at 18:44

## 2018-04-24 RX ADMIN — DEXAMETHASONE SODIUM PHOSPHATE 8 MG: 4 INJECTION, SOLUTION INTRA-ARTICULAR; INTRALESIONAL; INTRAMUSCULAR; INTRAVENOUS; SOFT TISSUE at 14:53

## 2018-04-24 RX ADMIN — MORPHINE SULFATE 200 MCG: 0.5 INJECTION, SOLUTION EPIDURAL; INTRATHECAL; INTRAVENOUS at 14:27

## 2018-04-24 RX ADMIN — FAMOTIDINE 20 MG: 20 TABLET, FILM COATED ORAL at 13:45

## 2018-04-24 RX ADMIN — KETOROLAC TROMETHAMINE 30 MG: 30 INJECTION, SOLUTION INTRAMUSCULAR at 18:44

## 2018-04-24 RX ADMIN — Medication 250 ML/HR: at 14:52

## 2018-04-24 RX ADMIN — BUPIVACAINE HYDROCHLORIDE 1.7 ML: 7.5 INJECTION, SOLUTION INTRASPINAL at 14:27

## 2018-04-24 RX ADMIN — EPHEDRINE SULFATE 10 MG: 50 INJECTION, SOLUTION INTRAVENOUS at 14:42

## 2018-04-24 RX ADMIN — EPHEDRINE SULFATE 5 MG: 50 INJECTION, SOLUTION INTRAVENOUS at 14:36

## 2018-04-24 RX ADMIN — EPHEDRINE SULFATE 5 MG: 50 INJECTION, SOLUTION INTRAVENOUS at 14:38

## 2018-04-24 RX ADMIN — EPHEDRINE SULFATE 10 MG: 50 INJECTION, SOLUTION INTRAVENOUS at 14:45

## 2018-04-24 RX ADMIN — EPHEDRINE SULFATE 10 MG: 50 INJECTION, SOLUTION INTRAVENOUS at 14:54

## 2018-04-24 RX ADMIN — EPHEDRINE SULFATE 25 MG: 50 INJECTION, SOLUTION INTRAVENOUS at 14:56

## 2018-04-24 RX ADMIN — Medication 2 G: at 14:21

## 2018-04-24 RX ADMIN — SODIUM CHLORIDE, SODIUM LACTATE, POTASSIUM CHLORIDE, AND CALCIUM CHLORIDE 75 ML/HR: 600; 310; 30; 20 INJECTION, SOLUTION INTRAVENOUS at 18:15

## 2018-04-24 RX ADMIN — EPHEDRINE SULFATE 5 MG: 50 INJECTION, SOLUTION INTRAVENOUS at 14:46

## 2018-04-24 RX ADMIN — SODIUM CHLORIDE, SODIUM LACTATE, POTASSIUM CHLORIDE, CALCIUM CHLORIDE: 600; 310; 30; 20 INJECTION, SOLUTION INTRAVENOUS at 14:21

## 2018-04-24 RX ADMIN — EPHEDRINE SULFATE 10 MG: 50 INJECTION, SOLUTION INTRAVENOUS at 14:50

## 2018-04-24 RX ADMIN — FENTANYL CITRATE 15 MCG: 50 INJECTION, SOLUTION INTRAMUSCULAR; INTRAVENOUS at 14:27

## 2018-04-24 RX ADMIN — EPHEDRINE SULFATE 10 MG: 50 INJECTION, SOLUTION INTRAVENOUS at 14:52

## 2018-04-24 RX ADMIN — METHYLERGONOVINE MALEATE 0.2 MG: 0.2 INJECTION INTRAVENOUS at 22:33

## 2018-04-24 RX ADMIN — EPHEDRINE SULFATE 10 MG: 50 INJECTION, SOLUTION INTRAVENOUS at 14:48

## 2018-04-24 RX ADMIN — ONDANSETRON 4 MG: 2 INJECTION INTRAMUSCULAR; INTRAVENOUS at 14:53

## 2018-04-24 NOTE — PROGRESS NOTES
ADAM Early RN and assumed care of patient. Patient sitting in bed, eating dinner.  at bedside. Patient's IV is infiltrated. Stopped IVF. Removed IV. Tip intact.  Will restart IV

## 2018-04-24 NOTE — ANESTHESIA POSTPROCEDURE EVALUATION
Post-Anesthesia Evaluation and Assessment    Patient: Cresencio Segura MRN: [id number]  SSN: [id number]    YOB: 1985  Age: 28 y.o. Sex: female       Cardiovascular Function/Vital Signs  Visit Vitals    /57    Pulse 74    Temp 36.4 °C (97.6 °F)    Resp 18    SpO2 100%    Breastfeeding Unknown       Patient is status post spinal anesthesia for Procedure(s):   SECTION. Nausea/Vomiting: None    Postoperative hydration reviewed and adequate. Pain:  Pain Scale 1: Numeric (0 - 10) (18 184)  Pain Intensity 1: 3 (18 184)   Managed    Neurological Status:   Neuro (WDL): Exceptions to WDL (18 1635)  Neuro  Neurologic State: Alert (18 1635)  Orientation Level: Oriented X4 (18 1635)  Cognition: Appropriate decision making (18 1635)  Speech: Clear (18 1635)  LUE Motor Response: Purposeful (18 1750)  LLE Motor Response: Pharmacologically paralyzed (18 1750)  RUE Motor Response: Purposeful (18 1750)  RLE Motor Response: Pharmacologically paralyzed (18 1750)   At baseline    Mental Status and Level of Consciousness: Arousable    Pulmonary Status:   O2 Device: Room air (18 1750)   Adequate oxygenation and airway patent    Complications related to anesthesia: None    Post-anesthesia assessment completed.  No concerns    Signed By: Michael Kohli MD     2018

## 2018-04-24 NOTE — PROGRESS NOTES
Pepcid given for heartburn per MD order- see [address] Akhtar  2 Failed attempts to restart IV on right hand. Called charge nurse to try start IV. Monitor applied for NST.

## 2018-04-24 NOTE — L&D DELIVERY NOTE
LOW TRANSVERSE  SECTION   FULL OP NOTE    PATIENT: Amanda Estrada  MRN: [id number]  DATE: 18        PREOPERATIVE DIAGNOSIS:  29yo multiparous patient with insulin-dependent A2 diabetes, polyhydramnios, history of recurrent miscarriages, with recommended delivery at 38 wks per [facility]    POSTOPERATIVE DIAGNOSIS: Same + probable placental abruption    PROCEDURE: Repeat low transverse  section    SURGEON: Barbara Heck MD    ANESTHESIA: Spinal    ESTIMATED BLOOD LOSS:   700    SPECIMENS: Cord blood, cord gases, placenta sent for disposal    FINDINGS: Live vigorous male infant weighing 7 lbs 13 ounces with APGARS 7, 8. Uterus appeared to have very thin window in the lower uterine segment, probable placental abruption with approx 1/3 of the placenta containing dark clotted blood on the maternal surface. Moderate-sized hematoma adjacent to the right broad ligament that was stable and non-expanding. Normal ovaries and fallopian tubes bilaterally. Infant taken to the NICU in stable condition. PROCEDURE IN DETAIL: Informed consent was obtained upon admission and reviewed prior to proceeding to the operating room. The patient was taken to the operating room where spinal anesthesia was found to be adequate. Time out was done to confirm the operating procedure, surgeon, patient and site. Once confirmed by the team, procedure was started. The patient was prepped and draped in the normal sterile fashion. A Pfannenstiel skin incision was made with a scalpel and carried down to the underlying fascia. The fascia was incised and extended sharply with solorio scissors. The rectus muscles were divided in the midline sharply with elevation with hemostats. The peritoneum was entered sharply and extended with the Bovie and sharply with the metzenbaum scissors with good visualization of the bladder. A bladder blade was then inserted. A large uterine window was noted in the lower uterine segment.  A low transverse uterine incision was made with the scalpel and extended laterally with blunt finger dissection. Clear copious fluid was noted with ROM. The fetal head was then elevated to the hysterotomy and delivered atraumatically. The shoulders and torso followed without difficulty. The cord was doubly clamped and cut. The baby was handed off to the awaiting  Intensive Care Unit staff. Respiratory was present for delivery. APGARS were 7 and 8 at one and five minutes respectively. Pitocin was started IV. The placenta was then delivered spontaneously. It was intact with a 3-vessel cord and trailing membranes. The uterus was exteriorized and the uterus was cleared of all clots and debris. The uterine incision was closed in two layers; the first layer was a running locked layer of 0 Vicryl and the second layer was an imbricating layer of 0 Vicryl. A hematoma was noted along the right corner that was stable and not expanding. Good hemostasis was assured. Paracolic gutters were washed with warm normal saline. Inspection of the right lower quadrant revealed a normal-appearing appendix. The uterus was returned to the abdomen. Excellent hemostasis was confirmed. Due to the hematoma, although it was not expanding, a piece of surgifoam was placed over the hysterotomy. The peritoneum was then closed with 2-0 Vicryl in a running fashion. Excellent hemostasis was noted. A Vicryl suture was used to re-approximate the rectus muscles in the midline in an interrupted fashion. The fascia was closed with 0-Vicryl in a running fashion. Good hemostasis was assured throughout. A plain gut suture was used to close the subcutaneous layer. The skin was closed with 4-0 Monocryl on a Agustín needle in a subcuticular fashion. The patient tolerated the procedure well. Sponge, lap, and needle counts were correct times two. The patient was taken to the recovery in stable condition.  The infant was taken to the NICU in stable condition as well.    COMPLICATIONS: None    Delivery Summary    Patient: Cresencio Segura MRN: [id number]  SSN: [id number]    YOB: 1985  Age: 28 y.o. Sex: female        Information for the patient's :  Em Zarco [[id number]]       Labor Events:    Labor: No   Rupture Date:     Rupture Time:     Rupture Type AROM   Amniotic Fluid Volume: Polyhydramic    Amniotic Fluid Description: Clear None   Induction: None       Augmentation: None   Labor Events: None     Cervical Ripening:     None     Delivery Events:  Episiotomy: None   Laceration(s): None     Repaired: None    Number of Repair Packets:     Suture Type and Size: None     Estimated Blood Loss (ml): 700.00ml       Delivery Date: 2018    Delivery Time: 2:51 PM  Delivery Type: , Low Transverse   Details    Trial of Labor: No   Primary/Repeat: Repeat   Priority: Routine   Indications: Other (Add Comments)   Incision type:     Sex:  Male     Gestational Age: 42w0d  Delivery Clinician:  Kenia Agosto  Living Status: Living   Delivery Location: OR            APGARS  One minute Five minutes Ten minutes   Skin color: 0   1        Heart rate: 2   2        Grimace: 2   2        Muscle tone: 1   2        Breathin   1        Totals: 7   8          Presentation: Vertex    Position: Left Occiput Anterior  Resuscitation Method:  Tactile Stimulation;Suctioning-bulb;C-PAP     Meconium Stained: None      Cord Vessels: 3 Vessels      Cord Events:    Cord Blood Sent?:  Yes    Blood Gases Sent?:  Yes    Placenta:  Date/Time:   2:53 PM  Removal: Expressed      Appearance: Intact; Other (comment)     [place] Measurements:  Birth Weight: 7 lb 13.2 oz (3.55 kg)      Birth Length:        Head Circumference:        Chest Circumference:       Abdominal Girth:       Other Providers:   Avery LAMBERT;MARYANN MONTANEZ;KERLINE DIAZ;;MARYANN CANELA;MARCELO MOSES;SAMEER MCKEON;;;, Obstetrician;Primary Nurse;Primary  Nurse;Nicu Nurse;Neonatologist;Anesthesiologist;Crna;Nurse Practitioner;Midwife;Nursery Nurse             Group B Strep:   Lab Results   Component Value Date/Time    GrBStrep, External Positive  2017     Information for the patient's :  Eun Rose [[id number]]     Lab Results   Component Value Date/Time    ABO/Rh(D) B POSITIVE 2018 02:51 PM    NIRANJAN IgG NEG 2018 02:51 PM     Lab Results   Component Value Date/Time    APH 7.332 (H) 2018 02:51 PM    APCO2 50 (H) 2018 02:51 PM    APO2 14 2018 02:51 PM    AHCO3 26 2018 02:51 PM    ABDC 0.7 2018 02:51 PM    EPHV 7.407 2018 02:51 PM    PCO2V 40 2018 02:51 PM    PO2V 22 (L) 2018 02:51 PM    HCO3V 25 2018 02:51 PM    EBEV 0.0 (L) 2018 02:51 PM    SITE CORD 2018 02:51 PM    SITE CORD 2018 02:51 PM    RSCOM Dr. Beryle Crown at 2018 3 15 21 PM. Read back. 2018 02:51 PM    RSCOM Dr. Beryle Crown at 2018 3 15 37 PM. Read back.  2018 02:51 PM

## 2018-04-24 NOTE — ANESTHESIA PREPROCEDURE EVALUATION
Anesthetic History   No history of anesthetic complications            Review of Systems / Medical History  Patient summary reviewed and pertinent labs reviewed    Pulmonary  Within defined limits                 Neuro/Psych   Within defined limits           Cardiovascular  Within defined limits                Exercise tolerance: >4 METS     GI/Hepatic/Renal                Endo/Other    Diabetes (Gestational DM - on Insulin): well controlled         Other Findings   Comments: Polyhydramnios           Physical Exam    Airway  Mallampati: II  TM Distance: 4 - 6 cm  Neck ROM: normal range of motion   Mouth opening: Normal     Cardiovascular    Rhythm: regular  Rate: normal         Dental  No notable dental hx       Pulmonary  Breath sounds clear to auscultation               Abdominal         Other Findings            Anesthetic Plan    ASA: 2  Anesthesia type: spinal      Post-op pain plan if not by surgeon: intrathecal opiates      Anesthetic plan and risks discussed with: Patient and Family

## 2018-04-24 NOTE — PROGRESS NOTES
0700 Bedside and Verbal shift change report given to PEGGY Chamberlain RN (oncoming nurse) by [employer]. Mable Mccauley RN (offgoing nurse). Report included the following information SBAR.   2495 RN to bedside. Shift assessment completed. EFM and toco applied. 0759 NST reactive. EFM and toco remvoed.

## 2018-04-24 NOTE — PROGRESS NOTES
Patient checks own blood sugar 1 hour post prandial = 115mg/dL.   No action taken as patient will be NPO after midnight

## 2018-04-24 NOTE — ROUTINE PROCESS
SBAR IN Report: Mother    Verbal report received from Adilson Moeller RN (full name & credentials) on this patient, who is now being transferred from L&D (unit) for routine progression of care. The patient is wearing a green \"Anesthesia-Duramorph\" band. Report consisted of patient's Situation, Background, Assessment and Recommendations (SBAR).  ID bands were compared with the identification form, and verified with the patient and transferring nurse. Information from the SBAR and the [place] Report was reviewed with the transferring nurse; opportunity for questions and clarification provided.

## 2018-04-24 NOTE — ANESTHESIA PROCEDURE NOTES
Spinal Block    Start time: 4/24/2018 2:25 PM  End time: 4/24/2018 2:28 PM  Performed by: Richi King  Authorized by: Richi King     Pre-procedure:   Indications: primary anesthetic  Preanesthetic Checklist: patient identified, risks and benefits discussed, anesthesia consent, site marked, patient being monitored and timeout performed    Timeout Time: 14:24          Spinal Block:   Patient Position:  Seated  Prep Region:  Lumbar  Prep: chlorhexidine      Location:  L3-4  Technique:  Single shot  Local:  Lidocaine 1%  Local Dose (mL):  3    Needle:   Needle Type:  Pencil-tip  Needle Gauge:  25 G  Attempts:  1      Events: CSF confirmed, no blood with aspiration and no paresthesia        Assessment:  Insertion:  Uncomplicated  Patient tolerance:  Patient tolerated the procedure well with no immediate complications

## 2018-04-24 NOTE — PROGRESS NOTES
SBAR OUT Report: Mother    Verbal report given to Franklin Sánchez RN (full name & credentials) on this patient, who is now being transferred to Mom Infant (unit) for routine progression of care. The patient is wearing a green \"Anesthesia-Duramorph\" band. Report consisted of patient's Situation, Background, Assessment and Recommendations (SBAR). [place] ID bands were compared with the identification form, and verified with the patient and receiving nurse. Information from the SBAR, Kardex, Intake/Output, MAR and Recent Results and the [place] Report was reviewed with the receiving nurse; opportunity for questions and clarification provided.

## 2018-04-24 NOTE — PROGRESS NOTES
Problem: Nutrition Deficit  Goal: *Optimize nutritional status  Nutrition  Received referral from Malnutrition Screening Tool r/t patient has Gestational Diabetes. Pt currently NPO; scheduled for  Section. Nutrition screen deferred at this time. F/U per nutrition standard of care.     Adrianna Valenzuela, [address] [place] [address], [facility]  [phone]

## 2018-04-25 LAB
GLUCOSE BLD STRIP.AUTO-MCNC: 155 MG/DL (ref 65–100)
HCT VFR BLD AUTO: 29.1 % (ref 35.8–46.3)
HGB BLD-MCNC: 9 G/DL (ref 11.7–15.4)

## 2018-04-25 PROCEDURE — 65270000029 HC RM PRIVATE

## 2018-04-25 PROCEDURE — 74011250637 HC RX REV CODE- 250/637: Performed by: ANESTHESIOLOGY

## 2018-04-25 PROCEDURE — 74011250637 HC RX REV CODE- 250/637: Performed by: OBSTETRICS & GYNECOLOGY

## 2018-04-25 PROCEDURE — 36415 COLL VENOUS BLD VENIPUNCTURE: CPT | Performed by: OBSTETRICS & GYNECOLOGY

## 2018-04-25 PROCEDURE — 74011250636 HC RX REV CODE- 250/636: Performed by: ANESTHESIOLOGY

## 2018-04-25 PROCEDURE — 82962 GLUCOSE BLOOD TEST: CPT

## 2018-04-25 PROCEDURE — 85018 HEMOGLOBIN: CPT | Performed by: OBSTETRICS & GYNECOLOGY

## 2018-04-25 RX ORDER — SODIUM CHLORIDE, SODIUM LACTATE, POTASSIUM CHLORIDE, CALCIUM CHLORIDE 600; 310; 30; 20 MG/100ML; MG/100ML; MG/100ML; MG/100ML
125 INJECTION, SOLUTION INTRAVENOUS CONTINUOUS
Status: DISCONTINUED | OUTPATIENT
Start: 2018-04-25 | End: 2018-04-25 | Stop reason: ALTCHOICE

## 2018-04-25 RX ORDER — LORAZEPAM 2 MG/ML
1 INJECTION INTRAMUSCULAR
Status: DISCONTINUED | OUTPATIENT
Start: 2018-04-25 | End: 2018-04-25 | Stop reason: ALTCHOICE

## 2018-04-25 RX ORDER — LORAZEPAM 1 MG/1
0.5 TABLET ORAL
Status: DISCONTINUED | OUTPATIENT
Start: 2018-04-25 | End: 2018-04-28 | Stop reason: HOSPADM

## 2018-04-25 RX ORDER — DIPHENHYDRAMINE HCL 25 MG
25 CAPSULE ORAL
Status: DISCONTINUED | OUTPATIENT
Start: 2018-04-25 | End: 2018-04-28 | Stop reason: HOSPADM

## 2018-04-25 RX ORDER — ZOLPIDEM TARTRATE 5 MG/1
5 TABLET ORAL
Status: DISCONTINUED | OUTPATIENT
Start: 2018-04-25 | End: 2018-04-28 | Stop reason: HOSPADM

## 2018-04-25 RX ORDER — ONDANSETRON 4 MG/1
4 TABLET, ORALLY DISINTEGRATING ORAL
Status: DISCONTINUED | OUTPATIENT
Start: 2018-04-25 | End: 2018-04-28 | Stop reason: HOSPADM

## 2018-04-25 RX ORDER — DOCUSATE SODIUM 100 MG/1
100 CAPSULE, LIQUID FILLED ORAL 2 TIMES DAILY
Status: DISCONTINUED | OUTPATIENT
Start: 2018-04-25 | End: 2018-04-28 | Stop reason: HOSPADM

## 2018-04-25 RX ORDER — OXYCODONE HYDROCHLORIDE 5 MG/1
5 TABLET ORAL
Status: DISCONTINUED | OUTPATIENT
Start: 2018-04-25 | End: 2018-04-28 | Stop reason: HOSPADM

## 2018-04-25 RX ORDER — NALOXONE HYDROCHLORIDE 0.4 MG/ML
0.4 INJECTION, SOLUTION INTRAMUSCULAR; INTRAVENOUS; SUBCUTANEOUS AS NEEDED
Status: DISCONTINUED | OUTPATIENT
Start: 2018-04-25 | End: 2018-04-28 | Stop reason: HOSPADM

## 2018-04-25 RX ORDER — DIPHENOXYLATE HYDROCHLORIDE AND ATROPINE SULFATE 2.5; .025 MG/1; MG/1
1 TABLET ORAL
Status: DISCONTINUED | OUTPATIENT
Start: 2018-04-25 | End: 2018-04-28 | Stop reason: HOSPADM

## 2018-04-25 RX ORDER — ACETAMINOPHEN 10 MG/ML
1000 INJECTION, SOLUTION INTRAVENOUS EVERY 6 HOURS
Status: DISCONTINUED | OUTPATIENT
Start: 2018-04-25 | End: 2018-04-25 | Stop reason: ALTCHOICE

## 2018-04-25 RX ORDER — OXYCODONE HYDROCHLORIDE 5 MG/1
10 TABLET ORAL
Status: DISCONTINUED | OUTPATIENT
Start: 2018-04-25 | End: 2018-04-28 | Stop reason: HOSPADM

## 2018-04-25 RX ORDER — SODIUM CHLORIDE 0.9 % (FLUSH) 0.9 %
5-10 SYRINGE (ML) INJECTION AS NEEDED
Status: DISCONTINUED | OUTPATIENT
Start: 2018-04-25 | End: 2018-04-25 | Stop reason: ALTCHOICE

## 2018-04-25 RX ORDER — SODIUM CHLORIDE 0.9 % (FLUSH) 0.9 %
5-10 SYRINGE (ML) INJECTION EVERY 8 HOURS
Status: DISCONTINUED | OUTPATIENT
Start: 2018-04-25 | End: 2018-04-25 | Stop reason: ALTCHOICE

## 2018-04-25 RX ORDER — SIMETHICONE 80 MG
80 TABLET,CHEWABLE ORAL
Status: DISCONTINUED | OUTPATIENT
Start: 2018-04-25 | End: 2018-04-28 | Stop reason: HOSPADM

## 2018-04-25 RX ORDER — IBUPROFEN 800 MG/1
800 TABLET ORAL
Status: DISCONTINUED | OUTPATIENT
Start: 2018-04-25 | End: 2018-04-28 | Stop reason: HOSPADM

## 2018-04-25 RX ADMIN — IBUPROFEN 800 MG: 800 TABLET ORAL at 20:58

## 2018-04-25 RX ADMIN — OXYCODONE HYDROCHLORIDE 10 MG: 5 TABLET ORAL at 02:10

## 2018-04-25 RX ADMIN — DOCUSATE SODIUM 100 MG: 100 CAPSULE, LIQUID FILLED ORAL at 17:50

## 2018-04-25 RX ADMIN — ACETAMINOPHEN 1000 MG: 500 TABLET, FILM COATED ORAL at 00:29

## 2018-04-25 RX ADMIN — SIMETHICONE CHEW TAB 80 MG 80 MG: 80 TABLET ORAL at 23:21

## 2018-04-25 RX ADMIN — SIMETHICONE CHEW TAB 80 MG 80 MG: 80 TABLET ORAL at 17:50

## 2018-04-25 RX ADMIN — OXYCODONE HYDROCHLORIDE 10 MG: 5 TABLET ORAL at 15:08

## 2018-04-25 RX ADMIN — HYDROMORPHONE HYDROCHLORIDE 0.5 MG: 2 INJECTION, SOLUTION INTRAMUSCULAR; INTRAVENOUS; SUBCUTANEOUS at 10:39

## 2018-04-25 RX ADMIN — ACETAMINOPHEN 1000 MG: 500 TABLET, FILM COATED ORAL at 06:29

## 2018-04-25 RX ADMIN — OXYCODONE HYDROCHLORIDE 15 MG: 5 TABLET ORAL at 09:16

## 2018-04-25 RX ADMIN — IBUPROFEN 800 MG: 800 TABLET ORAL at 15:08

## 2018-04-25 RX ADMIN — ACETAMINOPHEN 1000 MG: 500 TABLET, FILM COATED ORAL at 11:59

## 2018-04-25 RX ADMIN — OXYCODONE HYDROCHLORIDE 10 MG: 5 TABLET ORAL at 20:59

## 2018-04-25 NOTE — PROGRESS NOTES
Shift assessment completed. Assisted patient OOB to wheelchair per patient's request so she can go see baby in the SCN. Patient's IV capped and SCDs removed and lundberg left in place at this time. Patient tolerate well.  accompanied patient via wheelchair to see baby in the SCN at this time.

## 2018-04-25 NOTE — PROGRESS NOTES
Patient called out with concerns of feeling a gush of blood. This RN enters the room and upon initial assessment uterus was boggy and having trickling upon massage, uterus firms up with massage and remains F-1 and ML. This RN stays with patient at this time; however, called out for Victoriano Jefferson, Charge RN to call the OB. Orders were received from Dr. Jama Laurent at that time. Vital signs remain stable, see doc flowsheet. IM Methergine given. Will continue to monitor patient.

## 2018-04-25 NOTE — LACTATION NOTE
Saw mom in NCN briefly. Mom stated that she is pumping every 2-3 hours. She has expressed as much as 2ml at one pumping. She had already been instructed on how to label and store her colostrum. Mom had just held infant skin to skin and she was going to her room to pump. Mom stated that she has no concerns at this time. Lactation consultant will follow up as needed.

## 2018-04-25 NOTE — PROGRESS NOTES
2228 - Patient called out to desk stating that she felt increased bleeding. Primary RN into room to assess patient. Per primary RN, Esteban Rodriguez, patient's fundus below umbilicus - 1, boggy, and a constant trickle of blood noted. Call placed to Dr. Patt Shah to report patient's increased bleeding. Telephone orders received for IM Methergine 0.2mg  x 1 now and Cytotec 1000mcg PO to be given if patient's trickling of blood persists. 2233 - IM Methergine 0.2mg given in left quadriceps. See MAR.      2236 - Patient's fundus now: firm, below umbilicus - 1, and scant bleeding noted on luis felipe-pad.

## 2018-04-25 NOTE — PROGRESS NOTES
Notified Dr. Odilia Driver with concerns of urine color upon emptying patient's catheter: 700mls of dark serjio colored urine. Orders received to leave catheter in place and hepwell IV until Dr. Nadia Brooks makes her rounds this am.  Will continue to monitor at this time.

## 2018-04-25 NOTE — PROGRESS NOTES
Almonte removed per MD orders. 600ml of serjio colored urine removed. Patient tolerated well. Primary nurse aware.

## 2018-04-25 NOTE — PROGRESS NOTES
Received telephone call from Dr. Ladan Morocho for update on patient. Updated MD on patient: fundus firm, below umbilicus - 1, scant bleeding noted on luis felipe-pad. Received telephone orders with verbal readback to discontinue Toradol. Received orders for Oxycodone 15mg Q6H PRN and Tylenol 1000mg Q6H scheduled. See MAR. Will notify MD if bleeding increases. Primary RN aware.

## 2018-04-25 NOTE — PROGRESS NOTES
Post-Operative Day Number 1 Progress Note    Patient doing well post-op day 1 from  delivery without significant complaints. Pain controlled on current medication. Voiding without difficulty, normal lochia. Vitals:  Patient Vitals for the past 8 hrs:   BP Temp Pulse Resp   18 1100 91/51 98.5 °F (36.9 °C) 74 16   18 0810 97/55 98.6 °F (37 °C) 65 18     Temp (24hrs), Av °F (36.7 °C), Min:97.2 °F (36.2 °C), Max:98.6 °F (37 °C)      Vital signs stable, afebrile. Exam:  Patient without distress. Abdomen soft, fundus firm at level of umbilicus, appropriately tender. Incision dry and clean without erythema. Lower extremities are negative for swelling, cords or tenderness. Lab/Data Review: All lab results for the last 24 hours reviewed. Assessment and Plan:  Patient appears to be having uncomplicated post- course although had some blood in her urine overnight - appears serjio-colored in bag, currently draining clear yellow urine. Discussed there were no concerns in her section for bladder injury, however, we will monitor her closely for adequate UOP which she has had thus far. Bleeding has been improved overnight as well, received one dose of methergine. Baby stable in the NICU, on oxygen at this time. Continue routine post-op care and maternal education. Pt will likely stay until POD#4 given the baby is in the NICU. Continue to check sugars for now.

## 2018-04-25 NOTE — PROGRESS NOTES
Anesthesiology  Post-op Note    Post-op day 1 s/p  via spinal with neuraxial opioids for post-op pain management. Visit Vitals    BP 92/53 (BP 1 Location: Right arm, BP Patient Position: At rest)    Pulse 85    Temp 36.8 °C (98.2 °F)    Resp 18    LMP 2017    SpO2 100%    Breastfeeding Unknown     Airway patent, patient appropriately hydrated and appears euvolemic. Patient is Alert and oriented. Pain is well controlled. Pruritus is well controlled. Nausea is well controlled. No complaints about back or site of injection. Motor and sensory function has returned to baseline in lower extremities. Patient is satisfied with anesthetic and reports no complications. Continue current orders, then initiate surgeon's orders for pain management 24 hours after . Follow up per surgeon.

## 2018-04-26 PROCEDURE — 74011250637 HC RX REV CODE- 250/637: Performed by: OBSTETRICS & GYNECOLOGY

## 2018-04-26 PROCEDURE — 65270000029 HC RM PRIVATE

## 2018-04-26 RX ADMIN — DOCUSATE SODIUM 100 MG: 100 CAPSULE, LIQUID FILLED ORAL at 07:55

## 2018-04-26 RX ADMIN — DOCUSATE SODIUM 100 MG: 100 CAPSULE, LIQUID FILLED ORAL at 17:33

## 2018-04-26 RX ADMIN — OXYCODONE HYDROCHLORIDE 10 MG: 5 TABLET ORAL at 11:03

## 2018-04-26 RX ADMIN — SIMETHICONE CHEW TAB 80 MG 80 MG: 80 TABLET ORAL at 11:03

## 2018-04-26 RX ADMIN — OXYCODONE HYDROCHLORIDE 5 MG: 5 TABLET ORAL at 07:06

## 2018-04-26 RX ADMIN — OXYCODONE HYDROCHLORIDE 10 MG: 5 TABLET ORAL at 02:39

## 2018-04-26 RX ADMIN — SIMETHICONE CHEW TAB 80 MG 80 MG: 80 TABLET ORAL at 07:55

## 2018-04-26 RX ADMIN — OXYCODONE HYDROCHLORIDE 10 MG: 5 TABLET ORAL at 15:00

## 2018-04-26 RX ADMIN — SIMETHICONE CHEW TAB 80 MG 80 MG: 80 TABLET ORAL at 16:30

## 2018-04-26 RX ADMIN — OXYCODONE HYDROCHLORIDE 15 MG: 5 TABLET ORAL at 19:36

## 2018-04-26 NOTE — PROGRESS NOTES
Good uop  afeb vss abd bg not cw compl                              Post-Operative Day Number 2 Progress Note    Patient doing well post-op day 2 from  delivery without significant complaints. Pain controlled on current medication. Voiding without difficulty, normal lochia. Vitals:  Patient Vitals for the past 8 hrs:   BP Temp Pulse Resp   18 0712 90/55 98.8 °F (37.1 °C) 66 18   18 0238 102/50 - - -     Temp (24hrs), Av.1 °F (36.7 °C), Min:97.2 °F (36.2 °C), Max:98.8 °F (37.1 °C)      Vital signs stable, afebrile. Exam:  Patient without distress. Abdomen soft, fundus firm at level of umbilicus, non tender. Incision dry and clean without erythema. Lower extremities are negative for swelling, cords or tenderness. Lab/Data Review: All lab results for the last 24 hours reviewed. Assessment and Plan:  Patient appears to be having uncomplicated post- course. Continue routine post-op care and maternal education.

## 2018-04-26 NOTE — PROGRESS NOTES
Assessment complete. Patient requesting medication for pain. Oxy 15 mg requested. RN reviews pain location. Patient notes pain diffuse over entire abdomen and up into shoulders. RN educates patient on ambulation and warm fluids for help moving air in abdomen. Sleeping position reviewed as well. Patient voices understanding.

## 2018-04-26 NOTE — PROGRESS NOTES
Problem: Nutrition Deficit  Goal: *Optimize nutritional status  Nutrition Follow Up:  Reason for initial assessment: Referral received from nursing admission Malnutrition Screening Tool r/t patient with Gestational Diabetes. Assessment:   Diet order(s): Regular   Pt s/p day 2  Section; pt reports her appetite is picking up past surgery. Pt plans on breastfeeding. Anthropometrics:  Height 5'2\",  weight 68.9 kg (152#- Weight source unknown) documented   Macronutrient needs:  EER:   ~2223 kcal /day (25 kcal/kg BW + additional 500 kcal/day r/t lactation) adjust EER if post partum weight available  EPR:  ~75 grams protein/day (1 grams/kg pre pregnant IBW + additional 25 gm/day r/t lactation)  Intake/Comparative Standards:  No recorded intake; insufficient data to determine % estimated kcal and protein needs met at this time. Nutrition Diagnosis:  No nutrition diagnosis at this time. Intervention:  Meals and snacks: Continue current diet. Offered snacks; pt declines at this time. Education /Discharge nutrition plan:  Continue Regular Diet. Briefly reviewed nutrient requirements during lactation; pt verbalized understanding and anticipate good compliance.     Anca Galeano, [address], [facility]  [phone]

## 2018-04-26 NOTE — PROGRESS NOTES
Shift assessment completed. See flow sheet. Questions are encouraged and answered with patient. Instructed patient to call out with any needs. Patient denies needs at this time.

## 2018-04-26 NOTE — LACTATION NOTE
Met with mom in SCN when she came to visit and do skin to skin time with infant. Mom pumping diligently, obtained 5ml with last pumping. Mom confident with pumping, schedule and pump use. Noted that colostrum collected and labeled properly in syringe. Discussed skin to skin benefits and to engage in skin to skin as often as allowed. Baby remains on CPAP at this time. Mom unsure of discharge date, will discuss pump rental at that time. Mom to call out with any lactation needs. Will continue to follow closely.

## 2018-04-27 PROCEDURE — 74011250637 HC RX REV CODE- 250/637: Performed by: OBSTETRICS & GYNECOLOGY

## 2018-04-27 PROCEDURE — 65270000029 HC RM PRIVATE

## 2018-04-27 RX ORDER — BUTALBITAL, ACETAMINOPHEN AND CAFFEINE 50; 325; 40 MG/1; MG/1; MG/1
1 TABLET ORAL
Status: DISCONTINUED | OUTPATIENT
Start: 2018-04-27 | End: 2018-04-28 | Stop reason: HOSPADM

## 2018-04-27 RX ORDER — ACETAMINOPHEN 500 MG
1000 TABLET ORAL
Status: DISCONTINUED | OUTPATIENT
Start: 2018-04-27 | End: 2018-04-28 | Stop reason: HOSPADM

## 2018-04-27 RX ORDER — POLYETHYLENE GLYCOL 3350 17 G/17G
17 POWDER, FOR SOLUTION ORAL DAILY
Status: DISCONTINUED | OUTPATIENT
Start: 2018-04-27 | End: 2018-04-28 | Stop reason: HOSPADM

## 2018-04-27 RX ADMIN — OXYCODONE HYDROCHLORIDE 15 MG: 5 TABLET ORAL at 01:47

## 2018-04-27 RX ADMIN — OXYCODONE HYDROCHLORIDE 10 MG: 5 TABLET ORAL at 18:26

## 2018-04-27 RX ADMIN — IBUPROFEN 800 MG: 800 TABLET ORAL at 20:04

## 2018-04-27 RX ADMIN — POLYETHYLENE GLYCOL (3350) 17 G: 17 POWDER, FOR SOLUTION ORAL at 13:40

## 2018-04-27 RX ADMIN — SIMETHICONE CHEW TAB 80 MG 80 MG: 80 TABLET ORAL at 13:40

## 2018-04-27 RX ADMIN — DOCUSATE SODIUM 100 MG: 100 CAPSULE, LIQUID FILLED ORAL at 08:03

## 2018-04-27 RX ADMIN — OXYCODONE HYDROCHLORIDE 10 MG: 5 TABLET ORAL at 13:40

## 2018-04-27 RX ADMIN — SIMETHICONE CHEW TAB 80 MG 80 MG: 80 TABLET ORAL at 08:03

## 2018-04-27 RX ADMIN — OXYCODONE HYDROCHLORIDE 15 MG: 5 TABLET ORAL at 08:03

## 2018-04-27 RX ADMIN — SIMETHICONE CHEW TAB 80 MG 80 MG: 80 TABLET ORAL at 22:32

## 2018-04-27 RX ADMIN — OXYCODONE HYDROCHLORIDE 10 MG: 5 TABLET ORAL at 22:32

## 2018-04-27 RX ADMIN — IBUPROFEN 800 MG: 800 TABLET ORAL at 13:41

## 2018-04-27 RX ADMIN — SIMETHICONE CHEW TAB 80 MG 80 MG: 80 TABLET ORAL at 01:47

## 2018-04-27 RX ADMIN — SIMETHICONE CHEW TAB 80 MG 80 MG: 80 TABLET ORAL at 18:26

## 2018-04-27 RX ADMIN — DOCUSATE SODIUM 100 MG: 100 CAPSULE, LIQUID FILLED ORAL at 18:27

## 2018-04-27 NOTE — PROGRESS NOTES
Bedside report completed with Arik Aldrich. Plan of care reviewed with patient, verbalized understanding. Care assumed.

## 2018-04-27 NOTE — LACTATION NOTE
In to check on mom and pumping progress. Milk coming in well, engorged. Reviewed engorgement protocol- warm compresses before pumping, hands on pumping technique during pumping, ice packs after pumping. Begin motrin. Should improve over next 24 hours, will watch closely. Mom pumping diligently. Baby remains in SCN on CPAP. Is doing skin to skin in morning and evening. Will plan to rent hospital pump at discharge tomorrow.

## 2018-04-27 NOTE — PROGRESS NOTES
Progress Note                               Patient: Samanta Reynolds MRN: [id number]  SSN: [id number]    YOB: 1985  Age: 28 y.o. Sex: female      3 Days Post-Op     Subjective:     Patient doing well postpartum without significant complaints. Voiding without difficulty. Pain controlled on current medications but is taking Oxy 15 and no Ibuprofen. Lochia is appropriate, ambulating, passing flatus. Denies n/v, tolerating regular diet. States she feels good. Baby in NICU--possibly for another week. Pumping. Objective:     Patient Vitals for the past 12 hrs:   Temp Pulse Resp BP   18 0734 98.7 °F (37.1 °C) 88 16 100/50   18 2300 98.1 °F (36.7 °C) 78 16 94/53       Temp (24hrs), Av.4 °F (36.9 °C), Min:98.1 °F (36.7 °C), Max:98.7 °F (37.1 °C)      Physical Exam:    Constitutional: She appears well-developed and well-nourished. No distress.    HENT:    Head: Normocephalic and atraumatic.    Cardiovascular: RRR  Pulmonary/Chest: CTAB  Abd: S/appropriately TTP/ND, BS poactive, fundus firm at umbilicus, Incision c/d/i, no erythema/induration  Ext: No c/c/e      Lab/Data Review:  CBC:    Recent Labs      18   0644   HGB  9.0*   HCT  29.1*     GBS:   Lab Results   Component Value Date/Time    GrBStrep, External Positive  2017     Blood Type:   Lab Results   Component Value Date/Time    ABO/Rh(D) A POSITIVE 2018 11:14 AM    ABO,Rh A pos  2017        Assessment and Plan:     28 y.o.  POD# 3 from c/s:    1) Postop:  Meeting all goals, continue routine care. Desires to stay until POD#4 given baby in NICU. Counseled on need for addition of NSAIDs and to wean down the Oxy    2) Rh pos, Rub imm, pumping/breast feeding   3) A2DM/Poly:  Needs 2hr GTT 6-8wks pp. FSBG have been reasonable postop.   4) Anemia:  FeSO4/Vit C once having regular bms upon DC       Signed By: Erinn Peraza MD     2018

## 2018-04-27 NOTE — PROGRESS NOTES
SW assessment as baby is in the NICU. Patient states that she and  are coping well and that NICU staff have been very informative in keeping them updated on baby TJ. Patient states that she has a strong support system as well as consistent transportation while baby remains in the NICU.  provided education on [facility] Postpartum  Home Visit Program.  Family declined referral for home visit.  provided informational packet on  mood disorder education/resources. Family receptive to receiving information and denied any additional needs from . Family has this 's contact information should any needs/questions arise.     Harvey Mullen, [address] [facility]

## 2018-04-28 VITALS
HEART RATE: 65 BPM | RESPIRATION RATE: 18 BRPM | OXYGEN SATURATION: 97 % | SYSTOLIC BLOOD PRESSURE: 95 MMHG | TEMPERATURE: 98.3 F | DIASTOLIC BLOOD PRESSURE: 41 MMHG

## 2018-04-28 PROCEDURE — 74011250637 HC RX REV CODE- 250/637: Performed by: OBSTETRICS & GYNECOLOGY

## 2018-04-28 RX ORDER — IBUPROFEN 800 MG/1
800 TABLET ORAL EVERY 8 HOURS
Qty: 30 TAB | Refills: 0 | Status: SHIPPED | OUTPATIENT
Start: 2018-04-28 | End: 2018-06-14

## 2018-04-28 RX ORDER — OXYCODONE HYDROCHLORIDE 5 MG/1
5-15 TABLET ORAL
Qty: 40 TAB | Refills: 0 | Status: SHIPPED | OUTPATIENT
Start: 2018-04-28 | End: 2018-06-14

## 2018-04-28 RX ADMIN — POLYETHYLENE GLYCOL (3350) 17 G: 17 POWDER, FOR SOLUTION ORAL at 10:29

## 2018-04-28 RX ADMIN — IBUPROFEN 800 MG: 800 TABLET ORAL at 10:22

## 2018-04-28 RX ADMIN — OXYCODONE HYDROCHLORIDE 10 MG: 5 TABLET ORAL at 10:29

## 2018-04-28 RX ADMIN — OXYCODONE HYDROCHLORIDE 10 MG: 5 TABLET ORAL at 14:48

## 2018-04-28 RX ADMIN — IBUPROFEN 800 MG: 800 TABLET ORAL at 02:07

## 2018-04-28 RX ADMIN — DOCUSATE SODIUM 100 MG: 100 CAPSULE, LIQUID FILLED ORAL at 10:22

## 2018-04-28 RX ADMIN — OXYCODONE HYDROCHLORIDE 10 MG: 5 TABLET ORAL at 02:07

## 2018-04-28 RX ADMIN — SIMETHICONE CHEW TAB 80 MG 80 MG: 80 TABLET ORAL at 10:22

## 2018-04-28 RX ADMIN — OXYCODONE HYDROCHLORIDE 10 MG: 5 TABLET ORAL at 05:48

## 2018-04-28 RX ADMIN — SIMETHICONE CHEW TAB 80 MG 80 MG: 80 TABLET ORAL at 14:49

## 2018-04-28 NOTE — LACTATION NOTE
In to do discharge with mom. Mom continues to pump every 3 hrs and is getting about 80mls per pumping session. Per mom, infant no longer had to get any formula back in SCN. She did breastplay with infant yesterday. Mom to call out in next few days when infant off CPAP if would like help with latching, feeding, etc. Mom states her engorgement has resolved, no further issues. Mom has storage bottles for home and labels to bring in pumped milk. No further needs or questions at this time.

## 2018-04-28 NOTE — PROGRESS NOTES
Bedside report completed with Mindi TORRES RN. Plan of care reviewed with patient, verbalized understanding. Care assumed.

## 2018-04-28 NOTE — PROGRESS NOTES
Post-Operative Day Number 4 Progress/Discharge Note    Patient doing well post-op day 4 from  delivery without significant complaints. Pain controlled on current medication. Voiding without difficulty, normal lochia. Vitals:  Patient Vitals for the past 8 hrs:   BP Temp Pulse Resp   18 0728 95/41 98.3 °F (36.8 °C) 65 18     Temp (24hrs), Av.5 °F (36.9 °C), Min:98.3 °F (36.8 °C), Max:98.7 °F (37.1 °C)      Vital signs stable, afebrile. Exam:  Patient without distress. Abdomen soft, fundus firm at level of umbilicus, non tender. Incision dry and                      clean without erythema. Lower extremities are negative for swelling, cords or tenderness. Lab/Data Review: All lab results for the last 24 hours reviewed. Lab Results   Component Value Date/Time    ABO/Rh(D) A POSITIVE 2018 11:14 AM    Antibody screen, External neg 2017    Antibody screen NEG 2018 11:14 AM    Rubella, External immune 2017    GrBStrep, External Positive  2017    HBsAg, External neg 2017    HIV, External NR  2017    RPR, External NR  2017    ABO,Rh A pos  2017      Problem List  Date Reviewed: 2018          Codes Class Noted    Pregnancy ICD-10-CM: Z34.90  ICD-9-CM: V22.2  2018        Anemia in pregnancy, third trimester ICD-10-CM: O99.013  ICD-9-CM: 648.23  3/12/2018    Overview Addendum 3/27/2018  8:49 AM by Mar ARNOLD hgb today 9.2   Rx FeSO4  TID w/ VIt C--pt states wasn't aware was supposed to be taking  Repeat CBC in 2wks--heme/onc ref if necessary  3/27/2018 at [facility]: Continues taking Fe daily. Last Hgb on 3/23/2018: 9.8               Maternal care for excessive fetal growth in third trimester ICD-10-CM: O36.63X0  ICD-9-CM: 656.63  3/6/2018    Overview Addendum 4/3/2018 10:24 AM by Froy Vale MD     3/27/2018 at [facility]:  Appropriate fetal growth.  AC 73%, overall 59%, NAEL 32.3 cm, UA Dopplers WNL, BPP 6/8. On monitor for NST, Reactive              Polyhydramnios in third trimester ICD-10-CM: O40. 3XX0  ICD-9-CM: 657.03  2/22/2018    Overview Addendum 4/3/2018 10:23 AM by Jay Jay Bianchi MD     3/6/2018 at [facility]:  NAEL 31.8 cm (DVP 10.7).  4/3/2018 at [facility]:  NAEL 32 (DVP 9.8); stable. · PTL precautions given. * (Principal)Insulin controlled gestational diabetes mellitus in third trimester ICD-10-CM: O24.414  ICD-9-CM: 648.83  12/21/2017    Overview Addendum 4/17/2018  8:53 AM by Vilma Meraz RN     4/3/2018 at [facility]:  Reassuring fetal status. BPP 8/8, NAEL 32 cm (DVP 9.8). BG logbook reviewed. BG ranges . Having occasional mild FBG and PP elevations with continued hypoglycemic lows of 30-50s after meals. Instructed pt to increase proteins/fats with meal to better sustain BG levels. Current Dose is now Levemir 18/14.  4/10/2018: Reassuring fetal status. BPP 8/8, NAEL 32.1 cm (DVP 10.2 cm). Glucose log reviewed. Ranges from 89 to 124. Just a few PP elevations and 1 FBG elevation. Continue current dosing. Current Dose is now Levemir 20 in the AM only. 4/17/2018 at [facility]: Reassuring fetal status, appropriate fetal growth, polyhydramnios. AC 56%, overall 55%, NAEL 30.6 cm (11.1 cm), UA Dopplers WNL, BPP 8/8. Glucose log reviewed. Ranges from 76 to 126. Just a few elevations, good control. Current dose is Levemir 18 units in the AM and can continue to decrease insulin based on numbers to prevent hypoglycemic episodes. RECOMMENDATIONS:  · Twice weekly testing, MFM on Tuesdays and primary OB on Fridays. · Decrease Levemir to above dose; adjust prn for decreases. · Repeat US for growth and diabetes check in 1 week at [facility]. · Plan repeat C/S at 38 to 39 weeks at this time. Echogenic focus of heart of fetus affecting antepartum care of mother ICD-10-CM: O35. 8XX0  ICD-9-CM: 655.83  12/21/2017    Overview Addendum 2/6/2018  8:37 AM by Delmer Opitz Cassandra Joy RN     Isolated, remainder of level 2 ultrasound normal.  2018 at [facility]:  Accelerated fetal growth; no PAC's noted today. Isolated EIF still seen. AC 83%, overall 68%, NAEL 20 cm. 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 Continue routine post-op care and maternal education. Plan discharge for today with follow up in our office in 1-2 weeks. Rh positive, rubella +  Pt is an experienced breastfeeder. SIDs  precautions reviewed  Baby stable in SCN, CPAP, hasn't been able to go to the breast yet. Pelvic rest x 6 wk ( no sex swimming, tampon or douching)  Pt may drive after 1-2 weeks as long as she is NOT taking narcotics & can quickly maneuver her foot from the gas to the brake. For the next 2-4 weeks:  eat, shower and nurse the baby. Advance activity as tolerated. Notify [facility] for temp > 100.5, vaginal discharge with odor or heavy VB, worsening pelvic/abdominal pain and/or other concerns. A2DM, BSs over all look good, one isolated BS max 155. Encouraged pt to continue the DM diet.

## 2018-04-28 NOTE — DISCHARGE SUMMARY
Obstetrical Discharge Summary     Name: Francoise Cortez MRN: [id number]  SSN: [id number]    YOB: 1985  Age: 28 y.o. Sex: female      Allergies: Latex and Adhesive tape-silicones    Admit Date: 2018    Discharge Date: 2018     Admitting Physician: Ze Navarrete MD     Attending Physician:  Ondina Steward MD     * Admission Diagnoses: Al 2018 Primary  Section GDM per MFM;triage;Pr*    * Discharge Diagnoses:   Information for the patient's :  Janeen Felty [[id number]]   Delivery of a 7 lb 13.2 oz (3.55 kg) male infant via , Low Transverse on 2018 at 2:51 PM  by . Apgars were 7 and 8. Additional Diagnoses:   Hospital Problems as of 2018  Date Reviewed: 2018          Codes Class Noted - Resolved POA    Pregnancy ICD-10-CM: Z34.90  ICD-9-CM: V22.2  2018 - Present Unknown        Anemia in pregnancy, third trimester ICD-10-CM: O99.013  ICD-9-CM: 648.23  3/12/2018 - Present Yes    Overview Addendum 3/27/2018  8:49 AM by Kendra Montiel     POC hgb today 9.2   Rx FeSO4  TID w/ VIt C--pt states wasn't aware was supposed to be taking  Repeat CBC in 2wks--heme/onc ref if necessary  3/27/2018 at [facility]: Continues taking Fe daily. Last Hgb on 3/23/2018: 9.8               Maternal care for excessive fetal growth in third trimester ICD-10-CM: O36.63X0  ICD-9-CM: 656.63  3/6/2018 - Present Yes    Overview Addendum 4/3/2018 10:24 AM by Dana Estrada MD     3/27/2018 at [facility]:  Appropriate fetal growth. AC 73%, overall 59%, NAEL 32.3 cm, UA Dopplers WNL, BPP 6/8. On monitor for NST, Reactive              Polyhydramnios in third trimester ICD-10-CM: O40. 3XX0  ICD-9-CM: 657.03  2018 - Present Yes    Overview Addendum 4/3/2018 10:23 AM by Dana Estrada MD     3/6/2018 at [facility]:  NAEL 31.8 cm (DVP 10.7).  4/3/2018 at [facility]:  NAEL 32 (DVP 9.8); stable. · PTL precautions given.              * (Principal)Insulin controlled gestational diabetes mellitus in third trimester ICD-10-CM: O24.414  ICD-9-CM: 648.83  2017 - Present Yes    Overview Addendum 2018  8:53 AM by Noah Machado RN     4/3/2018 at [facility]:  Reassuring fetal status. BPP 8/8, NAEL 32 cm (DVP 9.8). BG logbook reviewed. BG ranges . Having occasional mild FBG and PP elevations with continued hypoglycemic lows of 30-50s after meals. Instructed pt to increase proteins/fats with meal to better sustain BG levels. Current Dose is now Levemir 18/14.  4/10/2018: Reassuring fetal status. BPP 8/8, NAEL 32.1 cm (DVP 10.2 cm). Glucose log reviewed. Ranges from 89 to 124. Just a few PP elevations and 1 FBG elevation. Continue current dosing. Current Dose is now Levemir 20 in the AM only. 2018 at [facility]: Reassuring fetal status, appropriate fetal growth, polyhydramnios. AC 56%, overall 55%, NAEL 30.6 cm (11.1 cm), UA Dopplers WNL, BPP 8/8. Glucose log reviewed. Ranges from 76 to 126. Just a few elevations, good control. Current dose is Levemir 18 units in the AM and can continue to decrease insulin based on numbers to prevent hypoglycemic episodes. RECOMMENDATIONS:  · Twice weekly testing, MFM on  and primary OB on . · Decrease Levemir to above dose; adjust prn for decreases. · Repeat US for growth and diabetes check in 1 week at [facility]. · Plan repeat C/S at 38 to 39 weeks at this time.              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 ICD-10-CM: O26.23  ICD-9-CM: 646.33  Unknown - Present Yes    Overview Addendum 2017  9:57 AM by Apoorva Vieyra RN     Hx of Ectopic x 1 and SAB x 7                  Lab Results   Component Value Date/Time    ABO/Rh(D) A POSITIVE 2018 11:14 AM    Rubella, External immune 2017    GrBStrep, External Positive  2017    ABO,Rh A pos  2017      Immunization History   Administered Date(s) Administered    Tdap 2016       * Procedures:  Repeat   Procedure(s) with comments:   SECTION - Al 2018 Primary  Section GDM per MFM      Jyoti Rape  Depression Scale  I have been able to laugh and see the funny side of things: As much as I always could  I have looked forward with enjoyment to things: As much as I ever did  I have blamed myself unnecessarily when things went wrong: Not very often  I have been anxious or worried for no good reason: Hardly ever  I have felt scared or panicky for no very good reason: No, not at all  Things have been getting on top of me: No, most of the time I have coped quite well  I have been so unhappy that I have had difficulty sleeping: No, not at all  I have felt sad or miserable: Not very often  I have been so unhappy that I have been crying: Only occasionally  The thought of harming myself has occurred to me: Never  Total Score: 5    * Discharge Condition: good    [facility] Course: Normal hospital course following the delivery. * Disposition: Home    Discharge Medications:   Current Discharge Medication List      START taking these medications    Details   ibuprofen (MOTRIN) 800 mg tablet Take 1 Tab by mouth every eight (8) hours. Qty: 30 Tab, Refills: 0      oxyCODONE IR (ROXICODONE) 5 mg immediate release tablet Take 1-3 Tabs by mouth every six (6) hours as needed. Max Daily Amount: 60 mg. Indications: Pain  Qty: 40 Tab, Refills: 0    Associated Diagnoses: Post-op pain         CONTINUE these medications which have NOT CHANGED    Details   ascorbic acid, vitamin C, (VITAMIN C) 500 mg tablet Take 1 Tab by mouth three (3) times daily.   Qty: 90 Tab, Refills: 3 famotidine (PEPCID AC) 10 mg tablet Take 10 mg by mouth two (2) times a day. calcium carbonate (TUMS) 200 mg calcium (500 mg) chew Take 1 Tab by mouth daily. acetaminophen (TYLENOL EXTRA STRENGTH) 500 mg tablet Take  by mouth every six (6) hours as needed for Pain. PNV62/FA/OM3/DHA/EPA/FISH OIL (PRENATAL GUMMY PO) Take  by mouth. STOP taking these medications       ferrous sulfate (IRON, FERROUS SULFATE,) 325 mg (65 mg iron) tablet Comments:   Reason for Stopping:         insulin detemir (LEVEMIR FLEXTOUCH) 100 unit/mL (3 mL) inpn Comments:   Reason for Stopping:         Insulin Needles, Disposable, (UNIFINE PENTIPS) 32 gauge x 5/32\" ndle Comments:   Reason for Stopping:             * Follow-up Care/Patient Instructions:   Activity: Activity as tolerated, No lifting, Driving, or Strenuous exercise for 2-4 weeks, No driving for 2 weeks and No sex for 6 weeks  Diet: Diabetic Diet  Wound Care: As directed    Follow-up Information     Follow up With Details Comments [address], MD   [address] [place] [id number]  [phone]             Signed By:  Henrry Sepulveda MD     April 28, 2018

## 2018-04-28 NOTE — DISCHARGE INSTRUCTIONS
* Follow-up Care/Patient Instructions: Activity: Activity as tolerated, No lifting, Driving, or Strenuous exercise for 2-4 weeks, No driving for 2 weeks and No sex for 6 weeks  Diet: Diabetic Diet  Wound Care: As directed  Brice instruction to follow: Activity: Pelvis rest for 6 weeks     No heavy lifting over 15 lbs for 2 weeks     No driving for 2 weeks     No push/pull motion such as sweeping or vacuuming for 2 weeks     No tub baths for 6 weeks     section keep incision clean and dry, may shower as normal with soap and water. Inspect incision every day for signs of infection listed below. Continue to use luis felipe-bottle with every void or bowel movement until comfortable stopping. Change sanitary pad after each urination or bowel movement. Call MD for the following:      Fever over 101 F; pain not relieved by medication; foul smelling vaginal discharge or increase in vaginal bleeding. Redness, swelling, or drainage from  incision. Take medication as prescribed. Follow up with MD as order.  Section: What to Expect at [address] [place] [address]    A  section, or , is surgery to deliver your baby through a cut, called an incision, that the doctor makes in your lower belly and uterus. You may have some pain in your lower belly and need pain medicine for 1 to 2 weeks. You can expect some vaginal bleeding for several weeks. You will probably need about 6 weeks to fully recover. It is important to take it easy while the incision is healing. Avoid heavy lifting, strenuous activities, or exercises that strain the belly muscles while you are recovering. Ask a family member or friend for help with housework, cooking, and shopping. This care sheet gives you a general idea about how long it will take for you to recover. But each person recovers at a different pace. Follow the steps below to get better as quickly as possible.   How can you care for yourself at home?  Activity  ? · Rest when you feel tired. Getting enough sleep will help you recover. ? · Try to walk each day. Start by walking a little more than you did the day before. Bit by bit, increase the amount you walk. Walking boosts blood flow and helps prevent pneumonia, constipation, and blood clots. ? · Avoid strenuous activities, such as bicycle riding, jogging, weightlifting, and aerobic exercise, for 6 weeks or until your doctor says it is okay. ? · Until your doctor says it is okay, do not lift anything heavier than your baby. ? · Do not do sit-ups or other exercises that strain the belly muscles for 6 weeks or until your doctor says it is okay. ? · Hold a pillow over your incision when you cough or take deep breaths. This will support your belly and decrease your pain. ? · You may shower as usual. Pat the incision dry when you are done. ? · You will have some vaginal bleeding. Wear sanitary pads. Do not douche or use tampons until your doctor says it is okay. ? · Ask your doctor when you can drive again. ? · You will probably need to take at least 6 weeks off work. It depends on the type of work you do and how you feel. ? · Ask your doctor when it is okay for you to have sex. Diet  ? · You can eat your normal diet. If your stomach is upset, try bland, low-fat foods like plain rice, broiled chicken, toast, and yogurt. ? · Drink plenty of fluids (unless your doctor tells you not to). ? · You may notice that your bowel movements are not regular right after your surgery. This is common. Try to avoid constipation and straining with bowel movements. You may want to take a fiber supplement every day. If you have not had a bowel movement after a couple of days, ask your doctor about taking a mild laxative. ? · If you are breastfeeding, do not drink any alcohol. Medicines  ? · Your doctor will tell you if and when you can restart your medicines.  He or she will also give you instructions about taking any new medicines. ? · If you take blood thinners, such as warfarin (Coumadin), clopidogrel (Plavix), or aspirin, be sure to talk to your doctor. He or she will tell you if and when to start taking those medicines again. Make sure that you understand exactly what your doctor wants you to do. ? · Take pain medicines exactly as directed. ¨ If the doctor gave you a prescription medicine for pain, take it as prescribed. ¨ If you are not taking a prescription pain medicine, ask your doctor if you can take an over-the-counter medicine. ? · If you think your pain medicine is making you sick to your stomach:  ¨ Take your medicine after meals (unless your doctor has told you not to). ¨ Ask your doctor for a different pain medicine. ? · If your doctor prescribed antibiotics, take them as directed. Do not stop taking them just because you feel better. You need to take the full course of antibiotics. Incision care  ? · If you have strips of tape on the incision, leave the tape on for a week or until it falls off.   ? · Wash the area daily with warm, soapy water, and pat it dry. Don't use hydrogen peroxide or alcohol, which can slow healing. You may cover the area with a gauze bandage if it weeps or rubs against clothing. Change the bandage every day. ? · Keep the area clean and dry. Other instructions  ? · If you breastfeed your baby, you may be more comfortable while you are healing if you place the baby so that he or she is not resting on your belly. Try tucking your baby under your arm, with his or her body along the side you will be feeding on. Support your baby's upper body with your arm. With that hand you can control your baby's head to bring his or her mouth to your breast. This is sometimes called the football hold. Follow-up care is a key part of your treatment and safety. Be sure to make and go to all appointments, and call your doctor if you are having problems.  It's also a good idea to know your test results and keep a list of the medicines you take. When should you call for help? Call 911 anytime you think you may need emergency care. For example, call if:  ? · You passed out (lost consciousness). ? · You have chest pain, are short of breath, or cough up blood. ?Call your doctor now or seek immediate medical care if:  ? · You have pain that does not get better after you take pain medicine. ? · You have severe vaginal bleeding. ? · You are dizzy or lightheaded, or you feel like you may faint. ? · You have new or worse pain in your belly or pelvis. ? · You have loose stitches, or your incision comes open. ? · You have symptoms of infection, such as:  ¨ Increased pain, swelling, warmth, or redness. ¨ Red streaks leading from the incision. ¨ Pus draining from the incision. ¨ A fever. ? · You have symptoms of a blood clot in your leg (called a deep vein thrombosis), such as:  ¨ Pain in your calf, back of the knee, thigh, or groin. ¨ Redness and swelling in your leg or groin. ? Watch closely for changes in your health, and be sure to contact your doctor if:  ? · You do not get better as expected. Where can you learn more? Go to http://alberto-dasha.info/. Enter M806 in the search box to learn more about \" Section: What to Expect at Home. \"  Current as of: 2017  Content Version: 11.4  © 8979-7693 [employer]. Care instructions adapted under license by [employer] (which disclaims liability or warranty for this information). If you have questions about a medical condition or this instruction, always ask your healthcare professional. [place] [address] any warranty or liability for your use of this information. After Your Delivery (the Postpartum Period): Care Instructions  Your Care Instructions    Congratulations on the birth of your baby.  Like pregnancy, the  period can be a time of excitement, duc, and exhaustion. You may look at your wondrous little baby and feel happy. You may also be overwhelmed by your new sleep hours and new responsibilities. At first, babies often sleep during the days and are awake at night. They do not have a pattern or routine. They may make sudden gasps, jerk themselves awake, or look like they have crossed eyes. These are all normal, and they may even make you smile. In these first weeks after delivery, try to take good care of yourself. It may take 4 to 6 weeks to feel like yourself again, and possibly longer if you had a  birth. You will likely feel very tired for several weeks. Your days will be full of ups and downs, but lots of duc as well. Follow-up care is a key part of your treatment and safety. Be sure to make and go to all appointments, and call your doctor if you are having problems. It's also a good idea to know your test results and keep a list of the medicines you take. How can you care for yourself at home? Take care of your body after delivery  · Use pads instead of tampons for the bloody flow that may last as long as 2 weeks. · Ease cramps with ibuprofen (Advil, Motrin). · Ease soreness of hemorrhoids and the area between your vagina and rectum with ice compresses or witch hazel pads. · Ease constipation by drinking lots of fluid and eating high-fiber foods. Ask your doctor about over-the-counter stool softeners. · Cleanse yourself with a gentle squeeze of warm water from a bottle instead of wiping with toilet paper. · Take a sitz bath in warm water several times a day. · Wear a good nursing bra. Ease sore and swollen breasts with warm, wet washcloths. · If you are not breastfeeding, use ice rather than heat for breast soreness. · Your period may not start for several months if you are breastfeeding. You may bleed more, and longer at first, than you did before you got pregnant.   · Wait until you are healed (about 4 to 6 weeks) before you have sexual intercourse. Your doctor will tell you when it is okay to have sex. · Try not to travel with your baby for 5 or 6 weeks. If you take a long car trip, make frequent stops to walk around and stretch. Avoid exhaustion  · Rest every day. Try to nap when your baby naps. · Ask another adult to be with you for a few days after delivery. · Plan for  if you have other children. · Stay flexible so you can eat at odd hours and sleep when you need to. Both you and your baby are making new schedules. · Plan small trips to get out of the house. Change can make you feel less tired. · Ask for help with housework, cooking, and shopping. Remind yourself that your job is to care for your baby. Know about help for postpartum depression  · \"Baby blues\" are common for the first 1 to 2 weeks after birth. You may cry or feel sad or irritable for no reason. · Rest whenever you can. Being tired makes it harder to handle your emotions. · Go for walks with your baby. · Talk to your partner, friends, and family about your feelings. · If your symptoms last for more than a few weeks, or if you feel very depressed, ask your doctor for help. · Postpartum depression can be treated. Support groups and counseling can help. Sometimes medicine can also help. Stay healthy  · Eat healthy foods so you have more energy, make good breast milk, and lose extra baby pounds. · If you breastfeed, avoid alcohol and drugs. Stay smoke-free. If you quit during pregnancy, congratulations. · Start daily exercise after 4 to 6 weeks, but rest when you feel tired. · Learn exercises to tone your belly. Do Kegel exercises to regain strength in your pelvic muscles. You can do these exercises while you stand or sit. ¨ Squeeze the same muscles you would use to stop your urine. Your belly and thighs should not move. ¨ Hold the squeeze for 3 seconds, and then relax for 3 seconds. ¨ Start with 3 seconds.  Then add 1 second each week until you are able to squeeze for 10 seconds. ¨ Repeat the exercise 10 to 15 times for each session. Do three or more sessions each day. · Find a class for new mothers and new babies that has an exercise time. · If you had a  birth, give yourself a bit more time before you exercise, and be careful. When should you call for help? Call 911 anytime you think you may need emergency care. For example, call if:  ? · You passed out (lost consciousness). ?Call your doctor now or seek immediate medical care if:  ? · You have severe vaginal bleeding. This means you are passing blood clots and soaking through a pad each hour for 2 or more hours. ? · You are dizzy or lightheaded, or you feel like you may faint. ? · You have a fever. ? · You have new belly pain, or your pain gets worse. ? Watch closely for changes in your health, and be sure to contact your doctor if:  ? · Your vaginal bleeding seems to be getting heavier. ? · You have new or worse vaginal discharge. ? · You feel sad, anxious, or hopeless for more than a few days. ? · You do not get better as expected. Where can you learn more? Go to http://albetro-dasha.info/. Enter A461 in the search box to learn more about \"After Your Delivery (the Postpartum Period): Care Instructions. \"  Current as of: 2017  Content Version: 11.4  © 5292-7554 [employer]. Care instructions adapted under license by [employer] (which disclaims liability or warranty for this information). If you have questions about a medical condition or this instruction, always ask your healthcare professional. [place] [address] any warranty or liability for your use of this information.

## 2018-04-28 NOTE — PROGRESS NOTES
Pt discharged from room. Discharge  teaching complete, pt verbalizes understanding; questions encouraged. Patient walked to car. Stable at discharge. Infant to remain in SCN at this time.

## 2022-03-18 PROBLEM — O36.63X0 MATERNAL CARE FOR EXCESSIVE FETAL GROWTH IN THIRD TRIMESTER: Status: ACTIVE | Noted: 2018-03-06

## 2022-03-19 PROBLEM — O24.414 INSULIN CONTROLLED GESTATIONAL DIABETES MELLITUS IN THIRD TRIMESTER: Status: ACTIVE | Noted: 2017-12-21

## 2022-03-19 PROBLEM — O35.BXX0 ECHOGENIC FOCUS OF HEART OF FETUS AFFECTING ANTEPARTUM CARE OF MOTHER: Status: ACTIVE | Noted: 2017-12-21

## 2022-03-19 PROBLEM — O09.93 HIGH-RISK PREGNANCY IN THIRD TRIMESTER: Status: ACTIVE | Noted: 2017-12-21

## 2022-03-19 PROBLEM — O34.219 PREVIOUS CESAREAN DELIVERY AFFECTING PREGNANCY: Status: ACTIVE | Noted: 2017-11-29

## 2022-03-19 PROBLEM — O40.3XX0 POLYHYDRAMNIOS IN THIRD TRIMESTER: Status: ACTIVE | Noted: 2018-02-22

## 2022-03-19 PROBLEM — Z34.90 PREGNANCY: Status: ACTIVE | Noted: 2018-04-23

## 2022-03-19 PROBLEM — O99.013 ANEMIA IN PREGNANCY, THIRD TRIMESTER: Status: ACTIVE | Noted: 2018-03-12

## 2024-01-30 ENCOUNTER — OFFICE VISIT (OUTPATIENT)
Dept: OBGYN CLINIC | Age: 39
End: 2024-01-30
Payer: COMMERCIAL

## 2024-01-30 VITALS — HEIGHT: 62 IN | WEIGHT: 150 LBS | BODY MASS INDEX: 27.6 KG/M2

## 2024-01-30 DIAGNOSIS — Z11.51 SCREENING FOR HUMAN PAPILLOMAVIRUS (HPV): ICD-10-CM

## 2024-01-30 DIAGNOSIS — Z12.4 SCREENING FOR CERVICAL CANCER: ICD-10-CM

## 2024-01-30 DIAGNOSIS — Z01.419 WELL WOMAN EXAM WITH ROUTINE GYNECOLOGICAL EXAM: Primary | ICD-10-CM

## 2024-01-30 DIAGNOSIS — Z12.31 SCREENING MAMMOGRAM FOR BREAST CANCER: ICD-10-CM

## 2024-01-30 PROCEDURE — 99395 PREV VISIT EST AGE 18-39: CPT | Performed by: OBSTETRICS & GYNECOLOGY

## 2024-01-30 NOTE — PROGRESS NOTES
Patient presents today for   Chief Complaint   Patient presents with    Annual Exam         LMP: Patient's last menstrual period was 2024.  Contraception: tubal ligation        Allergies   Allergen Reactions    Latex Hives    Adhesive Tape Rash     No current outpatient medications on file.     No current facility-administered medications for this visit.     Past Medical History:   Diagnosis Date    Anemia in pregnancy, third trimester     Anemia in pregnancy, third trimester 3/12/2018    Breast disorder     pt has 3 breast lumps on right side - one is tagged - PCP manages    Cholelithiasis with cholecystitis 2013    Diabetes (HCC)     gest    Elevated prolactin level 4/3/2015    Gallbladder attack     Gestational diabetes     History of bladder infections     Hx of ectopic pregnancy     Infertility, female     Oligomenorrhea     Other ill-defined conditions(799.89)     hx of a ectopic pregnancy 09    Ovarian cyst     PCOS (polycystic ovarian syndrome)     Polycystic disease, ovaries     Previous recurrent miscarriages affecting pregnancy, antepartum      Past Surgical History:   Procedure Laterality Date    BREAST LUMPECTOMY Right     x3 benign     DELIVERY ONLY      GYN      Right fallopian tube removed with ectopic pregnancy    LAP,TUBAL CAUTERY      WISDOM TOOTH EXTRACTION       Social History     Socioeconomic History    Marital status:      Spouse name: Not on file    Number of children: Not on file    Years of education: Not on file    Highest education level: Not on file   Occupational History    Not on file   Tobacco Use    Smoking status: Never     Passive exposure: Never    Smokeless tobacco: Never   Vaping Use    Vaping Use: Never used   Substance and Sexual Activity    Alcohol use: No    Drug use: No    Sexual activity: Yes     Partners: Male     Birth control/protection: None   Other Topics Concern    Not on file   Social History Narrative    Not on file

## 2024-02-07 LAB
COLLECTION METHOD: ABNORMAL
CYTOLOGIST CVX/VAG CYTO: ABNORMAL
CYTOLOGY CVX/VAG DOC THIN PREP: ABNORMAL
DATE OF LMP: ABNORMAL
HPV APTIMA: POSITIVE
HPV GENOTYPE REFLEX: ABNORMAL
Lab: ABNORMAL
OTHER PT INFO: ABNORMAL
PAP SOURCE: ABNORMAL
PATH REPORT.FINAL DX SPEC: ABNORMAL
PATHOLOGIST CVX/VAG CYTO: ABNORMAL
PATHOLOGIST PROVIDED ICD: ABNORMAL
PREV CYTO INFO: NEGATIVE
PREV TREATMENT RESULTS: ABNORMAL
PREV TREATMENT: ABNORMAL
RECOM F/U CVX/VAG CYTO: ABNORMAL
STAT OF ADQ CVX/VAG CYTO-IMP: ABNORMAL

## 2024-04-04 ENCOUNTER — PROCEDURE VISIT (OUTPATIENT)
Dept: OBGYN CLINIC | Age: 39
End: 2024-04-04

## 2024-04-04 VITALS
SYSTOLIC BLOOD PRESSURE: 100 MMHG | WEIGHT: 144 LBS | HEIGHT: 62 IN | BODY MASS INDEX: 26.5 KG/M2 | DIASTOLIC BLOOD PRESSURE: 60 MMHG

## 2024-04-04 DIAGNOSIS — R30.0 DYSURIA: ICD-10-CM

## 2024-04-04 DIAGNOSIS — Z91.89 AT HIGH RISK FOR PAIN FROM PROCEDURE: ICD-10-CM

## 2024-04-04 DIAGNOSIS — N90.89 VULVAR SKIN TAG: Primary | ICD-10-CM

## 2024-04-04 RX ORDER — LIDOCAINE AND PRILOCAINE 25; 25 MG/G; MG/G
CREAM TOPICAL PRN
Status: SHIPPED | OUTPATIENT
Start: 2024-04-04 | End: 2024-04-07

## 2024-04-04 SDOH — ECONOMIC STABILITY: HOUSING INSECURITY
IN THE LAST 12 MONTHS, WAS THERE A TIME WHEN YOU DID NOT HAVE A STEADY PLACE TO SLEEP OR SLEPT IN A SHELTER (INCLUDING NOW)?: NO

## 2024-04-04 SDOH — ECONOMIC STABILITY: INCOME INSECURITY: HOW HARD IS IT FOR YOU TO PAY FOR THE VERY BASICS LIKE FOOD, HOUSING, MEDICAL CARE, AND HEATING?: NOT HARD AT ALL

## 2024-04-04 SDOH — ECONOMIC STABILITY: FOOD INSECURITY: WITHIN THE PAST 12 MONTHS, YOU WORRIED THAT YOUR FOOD WOULD RUN OUT BEFORE YOU GOT MONEY TO BUY MORE.: NEVER TRUE

## 2024-04-04 SDOH — ECONOMIC STABILITY: FOOD INSECURITY: WITHIN THE PAST 12 MONTHS, THE FOOD YOU BOUGHT JUST DIDN'T LAST AND YOU DIDN'T HAVE MONEY TO GET MORE.: NEVER TRUE

## 2024-04-04 NOTE — PROGRESS NOTES
Chief Complaint   Patient presents with    Procedure     Excision of vulvar skin tag     No complaints other than irritation at the site of the skin tags.    Vitals:    04/04/24 1328   BP: 100/60         4/10/2024     2:38 PM 4/4/2024     1:28 PM 1/30/2024     8:52 AM   Weight Metrics   Weight 146 lb 144 lb 150 lb   BMI (Calculated) 26.8 kg/m2 26.4 kg/m2 27.5 kg/m2     Review of Systems   Constitutional: Negative.    HENT: Negative.     Eyes: Negative.    Respiratory: Negative.     Cardiovascular: Negative.    Gastrointestinal: Negative.    Endocrine: Negative.    Genitourinary: Negative.    Musculoskeletal: Negative.    Allergic/Immunologic: Negative.    Neurological: Negative.    Hematological: Negative.    Psychiatric/Behavioral: Negative.       Physical Exam  Vitals signs reviewed.   Constitutional:       General: She is not in acute distress.     Appearance: Normal appearance. She is well-developed. She is not ill-appearing, toxic-appearing or diaphoretic.   HENT:      Head: Normocephalic and atraumatic.      Nose: Nose normal.   Eyes:      General: No scleral icterus.     Conjunctiva/sclera: Conjunctivae normal.      Pupils: Pupils are equal, round, and reactive to light.   Neck:      Musculoskeletal: Normal range of motion. No acute abnormality  Pulmonary:      Effort: Pulmonary effort is normal. No respiratory distress.   Abdominal:      No distension.      Palpations: Abdomen is soft. There is no mass.      Tenderness: There is no abdominal tenderness. There is no guarding or rebound.   Genitourinary: Scattered small skin tags on perineum between the posterior fourchette and anus present. See last note.      Labia:         Right: No rash, tenderness, lesion or injury.         Left: No rash, tenderness, lesion or injury.       Vagina: Normal. No signs of injury and foreign body. No vaginal discharge, erythema, tenderness or bleeding.   Musculoskeletal: Normal range of motion.         General: No tenderness.

## 2024-04-05 RX ORDER — LIDOCAINE AND PRILOCAINE 25; 25 MG/G; MG/G
CREAM TOPICAL
Qty: 5 G | Refills: 0 | Status: SHIPPED | OUTPATIENT
Start: 2024-04-05

## 2024-04-07 LAB
BACTERIA SPEC CULT: ABNORMAL
SERVICE CMNT-IMP: ABNORMAL

## 2024-04-10 ENCOUNTER — OFFICE VISIT (OUTPATIENT)
Dept: OBGYN CLINIC | Age: 39
End: 2024-04-10
Payer: COMMERCIAL

## 2024-04-10 VITALS
DIASTOLIC BLOOD PRESSURE: 70 MMHG | WEIGHT: 146 LBS | SYSTOLIC BLOOD PRESSURE: 110 MMHG | BODY MASS INDEX: 26.87 KG/M2 | HEIGHT: 62 IN

## 2024-04-10 DIAGNOSIS — N90.89 VULVAR SKIN TAG: ICD-10-CM

## 2024-04-10 DIAGNOSIS — Z80.49 FAMILY HISTORY OF CERVICAL CANCER: ICD-10-CM

## 2024-04-10 DIAGNOSIS — R10.2 PELVIC PAIN: ICD-10-CM

## 2024-04-10 DIAGNOSIS — N92.0 MENORRHAGIA WITH REGULAR CYCLE: ICD-10-CM

## 2024-04-10 DIAGNOSIS — R87.610 ASCUS WITH POSITIVE HIGH RISK HPV CERVICAL: Primary | ICD-10-CM

## 2024-04-10 DIAGNOSIS — Z80.49 FAMILY HISTORY OF UTERINE CANCER: ICD-10-CM

## 2024-04-10 DIAGNOSIS — E28.2 PCOS (POLYCYSTIC OVARIAN SYNDROME): ICD-10-CM

## 2024-04-10 DIAGNOSIS — N94.6 PAINFUL MENSTRUAL PERIODS: ICD-10-CM

## 2024-04-10 DIAGNOSIS — R87.810 ASCUS WITH POSITIVE HIGH RISK HPV CERVICAL: Primary | ICD-10-CM

## 2024-04-10 PROCEDURE — 99213 OFFICE O/P EST LOW 20 MIN: CPT | Performed by: OBSTETRICS & GYNECOLOGY

## 2024-04-10 PROCEDURE — 57454 BX/CURETT OF CERVIX W/SCOPE: CPT | Performed by: OBSTETRICS & GYNECOLOGY

## 2024-04-16 ASSESSMENT — ENCOUNTER SYMPTOMS
EYES NEGATIVE: 1
RESPIRATORY NEGATIVE: 1
ALLERGIC/IMMUNOLOGIC NEGATIVE: 1
GASTROINTESTINAL NEGATIVE: 1

## 2024-04-19 ENCOUNTER — PREP FOR PROCEDURE (OUTPATIENT)
Dept: OBGYN CLINIC | Age: 39
End: 2024-04-19

## 2024-04-19 ENCOUNTER — TELEPHONE (OUTPATIENT)
Dept: OBGYN CLINIC | Age: 39
End: 2024-04-19

## 2024-04-19 DIAGNOSIS — N87.0 CERVICAL INTRAEPITHELIAL NEOPLASIA I: ICD-10-CM

## 2024-04-19 DIAGNOSIS — R10.2 PELVIC PAIN IN FEMALE: ICD-10-CM

## 2024-04-19 DIAGNOSIS — N94.6 DYSMENORRHEA: ICD-10-CM

## 2024-04-19 DIAGNOSIS — N94.10 DYSPAREUNIA, FEMALE: ICD-10-CM

## 2024-04-22 NOTE — PROGRESS NOTES
Enhanced Recovery After GYN Surgery: non-diabetic patients    It is highly recommended you purchase and drink Ensure Complete - one bottle twice daily for five days starting on 05/14/24. Ensure Complete is the preferred formula over other Ensure formulas. It is recommended that you continue drinking this for one month after surgery.    The night before surgery 05/19/24, drink 2 bottles of the Ensure Pre-Surgery drink.     The morning of surgery 05/20/24, drink one bottle of the Ensure Pre-Surgery drink 2 hours prior to your arrival to the hospital. Drink this over 5-10 minutes.    Drink nothing else after drinking the pre-surgical drink the morning of surgery.    Bring your patient handbook with you to the hospital.    Things to remember:    1. You will be given clear liquids to drink, advancing diet as tolerated    2. You will be up and moving around with assistance 2-4 hours after surgery.    3. You will be given regularly scheduled pain medications (NSAIDS, Tylenol) with narcotics as needed.    4. You may be able to go home that night if the surgeon okays and you are up and eating and drinking. Otherwise, your discharge will be the following morning around lunch time.     5. Continue drinking Ensure Complete for 5 days after surgery.

## 2024-04-24 NOTE — PERIOP NOTE
Patient verified name and     Order for consent NOT found in EHR; patient verified.     Type 2 surgery phone assessment complete.     Labs per surgeon: No orders received at time of assessment.   Labs per anesthesia protocol: HGB- to be collected on 24. T&S to be collected dos.   EKG: not needed per anesthesia protocol.       Patient informed of GYN class on 24 at which time labs will be drawn. Patient will also receive all patient education and if staying overnight will receive hospital approved surgical skin cleanser; if not, patient will use non-moisturizing soap.    Patient instructed to hold all vitamins 7 days prior to surgery and NSAIDS 5 days prior to surgery, patient verbalized understanding.    Patient instructed to continue previous medications as prescribed prior to surgery and to take the following medications the day of surgery according to anesthesia guidelines with a small sip of water: NONE.     Patient answered medical/surgical history questions at their best of ability. All prior to admission medications documented in [facility].

## 2024-04-24 NOTE — PERIOP NOTE
PLEASE CONTINUE TAKING ALL PRESCRIPTION MEDICATIONS UP TO THE DAY OF SURGERY UNLESS OTHERWISE DIRECTED BELOW. You may take Tylenol, allergy, and/or indigestion medications.     TAKE ONLY THESE MEDICATIONS ON THE DAY OF SURGERY ON 5/20/24      NONE           DISCONTINUE all vitamins, herbals, and supplements 7 days prior to surgery. DISCONTINUE Non-Steroidal Anti-Inflammatory (NSAIDS) such as Advil, Ibuprofen, Motrin, Aspirin, Naproxen, and Aleve 5 days prior to surgery.     Home Medications to Hold- please continue all other medications except these.      NONE        Comments      On the day before surgery (5/19/24) please take 2 Tylenol in the morning and then again before bed. You may use either regular or extra strength.      Bring: Photo ID and Insurance card        Please do not bring home medications with you on the day of surgery unless otherwise directed by your nurse.  If you are instructed to bring home medications, please give them to your nurse as they will be administered by the nursing staff.    If you have any questions, please call [facility] [phone].    A copy of this note was provided to the patient for reference.

## 2024-05-01 ENCOUNTER — HOSPITAL ENCOUNTER (OUTPATIENT)
Dept: SURGERY | Age: 39
Discharge: HOME | End: 2024-05-04
Payer: COMMERCIAL

## 2024-05-01 DIAGNOSIS — Z01.818 PRE-OP TESTING: ICD-10-CM

## 2024-05-01 LAB — HGB BLD-MCNC: 15.5 G/DL (ref 11.7–15.4)

## 2024-05-01 PROCEDURE — 36415 COLL VENOUS BLD VENIPUNCTURE: CPT

## 2024-05-01 PROCEDURE — 85018 HEMOGLOBIN: CPT

## 2024-05-13 ENCOUNTER — OFFICE VISIT (OUTPATIENT)
Dept: OBGYN CLINIC | Age: 39
End: 2024-05-13
Payer: COMMERCIAL

## 2024-05-13 VITALS
WEIGHT: 148 LBS | BODY MASS INDEX: 27.23 KG/M2 | SYSTOLIC BLOOD PRESSURE: 132 MMHG | DIASTOLIC BLOOD PRESSURE: 74 MMHG | HEIGHT: 62 IN

## 2024-05-13 DIAGNOSIS — E28.2 PCOS (POLYCYSTIC OVARIAN SYNDROME): ICD-10-CM

## 2024-05-13 DIAGNOSIS — Z98.891 HISTORY OF CESAREAN DELIVERY: ICD-10-CM

## 2024-05-13 DIAGNOSIS — Z80.49 FAMILY HISTORY OF CERVICAL CANCER: ICD-10-CM

## 2024-05-13 DIAGNOSIS — R10.2 PELVIC PAIN IN FEMALE: Primary | ICD-10-CM

## 2024-05-13 DIAGNOSIS — N92.0 MENORRHAGIA WITH REGULAR CYCLE: ICD-10-CM

## 2024-05-13 DIAGNOSIS — N94.6 PAINFUL MENSTRUAL PERIODS: ICD-10-CM

## 2024-05-13 DIAGNOSIS — Z80.49 FAMILY HISTORY OF UTERINE CANCER: ICD-10-CM

## 2024-05-13 PROCEDURE — 99213 OFFICE O/P EST LOW 20 MIN: CPT | Performed by: OBSTETRICS & GYNECOLOGY

## 2024-05-13 NOTE — PROGRESS NOTES
Chief Complaint   Patient presents with    Pre-op Exam       Marco Antonio Beard presents today for pre-operative evaluation.     Procedure planned: LAVH, BS, cystoscopy    Female Gyn History:  Patient's last menstrual period was 2024 (exact date).  OB History    Para Term  AB Living   11 2 2 0 9 2   SAB IAB Ectopic Molar Multiple Live Births   8 0 1 0 0 2      # Outcome Date GA Lbr Colin/2nd Weight Sex Delivery Anes PTL Lv   11 Term 18 38w0d  3.55 kg (7 lb 13.2 oz) M CS-LTranv  N MARCO      Name: RENETTA BEARD      Apgar1: 7  Apgar5: 8   10 Term 16 38w3d  3.265 kg (7 lb 3.2 oz) F CS-LTranv  N MARCO      Birth Comments: GDM (insulin)      Complications: Fetal Intolerance      Name: CHIRAGBABY GIRL      Apgar1: 9  Apgar5: 9   9 SAB            8 Ectopic            7 SAB            6 SAB            5 SAB            4 SAB            3 SAB            2 SAB            1 SAB                Allergies:   Allergies   Allergen Reactions    Latex Hives     NO Latex Catheter    Adhesive Tape Rash       Medication History:  Current Outpatient Medications   Medication Sig Dispense Refill    lidocaine-prilocaine (EMLA) 2.5-2.5 % cream Apply topically as needed. (Patient not taking: Reported on 2024) 5 g 0     No current facility-administered medications for this visit.       Past Medical History:  Past Medical History:   Diagnosis Date    Anemia in pregnancy, third trimester     Anemia in pregnancy, third trimester 3/12/2018    Breast disorder     pt has 3 breast lumps on right side - one is tagged - PCP manages    Cholelithiasis with cholecystitis 2013    Diabetes (HCC)     Gestational Only    Elevated prolactin level 4/3/2015    Gallbladder attack     Gestational diabetes     History of bladder infections     Hx of ectopic pregnancy     Infertility, female     Oligomenorrhea     Other ill-defined conditions(799.89)     hx of a ectopic pregnancy 09    Ovarian cyst

## 2024-05-19 PROBLEM — N94.6 PAINFUL MENSTRUAL PERIODS: Status: ACTIVE | Noted: 2024-05-19

## 2024-05-19 PROBLEM — N92.0 MENORRHAGIA WITH REGULAR CYCLE: Status: ACTIVE | Noted: 2024-05-19

## 2024-05-19 PROBLEM — E28.2 PCOS (POLYCYSTIC OVARIAN SYNDROME): Status: ACTIVE | Noted: 2024-05-19

## 2024-05-19 PROBLEM — Z98.891 HISTORY OF CESAREAN DELIVERY: Status: ACTIVE | Noted: 2024-05-19

## 2024-05-19 PROBLEM — Z80.49 FAMILY HISTORY OF CERVICAL CANCER: Status: ACTIVE | Noted: 2024-05-19

## 2024-05-19 PROBLEM — Z80.49 FAMILY HISTORY OF UTERINE CANCER: Status: ACTIVE | Noted: 2024-05-19

## 2024-05-19 RX ORDER — SODIUM CHLORIDE 0.9 % (FLUSH) 0.9 %
5-40 SYRINGE (ML) INJECTION PRN
Status: CANCELLED | OUTPATIENT
Start: 2024-05-19

## 2024-05-19 RX ORDER — SODIUM CHLORIDE 9 MG/ML
INJECTION, SOLUTION INTRAVENOUS PRN
Status: CANCELLED | OUTPATIENT
Start: 2024-05-19

## 2024-05-19 RX ORDER — MIDAZOLAM HYDROCHLORIDE 2 MG/2ML
2 INJECTION, SOLUTION INTRAMUSCULAR; INTRAVENOUS
Status: CANCELLED | OUTPATIENT
Start: 2024-05-19 | End: 2024-05-20

## 2024-05-19 RX ORDER — SODIUM CHLORIDE 0.9 % (FLUSH) 0.9 %
5-40 SYRINGE (ML) INJECTION EVERY 12 HOURS SCHEDULED
Status: CANCELLED | OUTPATIENT
Start: 2024-05-19

## 2024-05-20 ENCOUNTER — HOSPITAL ENCOUNTER (OUTPATIENT)
Age: 39
Discharge: HOME | End: 2024-05-20
Attending: OBSTETRICS & GYNECOLOGY | Admitting: OBSTETRICS & GYNECOLOGY
Payer: COMMERCIAL

## 2024-05-20 ENCOUNTER — ANESTHESIA EVENT (OUTPATIENT)
Dept: SURGERY | Age: 39
End: 2024-05-20
Payer: COMMERCIAL

## 2024-05-20 ENCOUNTER — ANESTHESIA (OUTPATIENT)
Dept: SURGERY | Age: 39
End: 2024-05-20
Payer: COMMERCIAL

## 2024-05-20 VITALS
HEART RATE: 76 BPM | DIASTOLIC BLOOD PRESSURE: 58 MMHG | TEMPERATURE: 97.8 F | HEIGHT: 62 IN | WEIGHT: 146.8 LBS | OXYGEN SATURATION: 94 % | BODY MASS INDEX: 27.02 KG/M2 | RESPIRATION RATE: 16 BRPM | SYSTOLIC BLOOD PRESSURE: 100 MMHG

## 2024-05-20 DIAGNOSIS — Z98.890 POST-OPERATIVE STATE: ICD-10-CM

## 2024-05-20 DIAGNOSIS — Z01.818 PRE-OP TESTING: Primary | ICD-10-CM

## 2024-05-20 PROBLEM — R10.2 PELVIC PAIN: Status: ACTIVE | Noted: 2024-05-20

## 2024-05-20 LAB
ABO + RH BLD: NORMAL
BLOOD GROUP ANTIBODIES SERPL: NORMAL
HCG UR QL: NEGATIVE
SPECIMEN EXP DATE BLD: NORMAL

## 2024-05-20 PROCEDURE — 3700000001 HC ADD 15 MINUTES (ANESTHESIA): Performed by: OBSTETRICS & GYNECOLOGY

## 2024-05-20 PROCEDURE — 81025 URINE PREGNANCY TEST: CPT

## 2024-05-20 PROCEDURE — 86900 BLOOD TYPING SEROLOGIC ABO: CPT

## 2024-05-20 PROCEDURE — 86850 RBC ANTIBODY SCREEN: CPT

## 2024-05-20 PROCEDURE — 88307 TISSUE EXAM BY PATHOLOGIST: CPT

## 2024-05-20 PROCEDURE — 2500000003 HC RX 250 WO HCPCS: Performed by: NURSE ANESTHETIST, CERTIFIED REGISTERED

## 2024-05-20 PROCEDURE — 6360000002 HC RX W HCPCS: Performed by: OBSTETRICS & GYNECOLOGY

## 2024-05-20 PROCEDURE — 88305 TISSUE EXAM BY PATHOLOGIST: CPT

## 2024-05-20 PROCEDURE — 2709999900 HC NON-CHARGEABLE SUPPLY: Performed by: OBSTETRICS & GYNECOLOGY

## 2024-05-20 PROCEDURE — 7100000010 HC PHASE II RECOVERY - FIRST 15 MIN: Performed by: OBSTETRICS & GYNECOLOGY

## 2024-05-20 PROCEDURE — 86901 BLOOD TYPING SEROLOGIC RH(D): CPT

## 2024-05-20 PROCEDURE — 58550 LAPARO-ASST VAG HYSTERECTOMY: CPT | Performed by: OBSTETRICS & GYNECOLOGY

## 2024-05-20 PROCEDURE — 6370000000 HC RX 637 (ALT 250 FOR IP): Performed by: ANESTHESIOLOGY

## 2024-05-20 PROCEDURE — 7100000011 HC PHASE II RECOVERY - ADDTL 15 MIN: Performed by: OBSTETRICS & GYNECOLOGY

## 2024-05-20 PROCEDURE — 7100000001 HC PACU RECOVERY - ADDTL 15 MIN: Performed by: OBSTETRICS & GYNECOLOGY

## 2024-05-20 PROCEDURE — 7100000000 HC PACU RECOVERY - FIRST 15 MIN: Performed by: OBSTETRICS & GYNECOLOGY

## 2024-05-20 PROCEDURE — 6360000002 HC RX W HCPCS: Performed by: NURSE ANESTHETIST, CERTIFIED REGISTERED

## 2024-05-20 PROCEDURE — 2500000003 HC RX 250 WO HCPCS: Performed by: OBSTETRICS & GYNECOLOGY

## 2024-05-20 PROCEDURE — 6360000002 HC RX W HCPCS: Performed by: ANESTHESIOLOGY

## 2024-05-20 PROCEDURE — 2580000003 HC RX 258: Performed by: ANESTHESIOLOGY

## 2024-05-20 PROCEDURE — 3600000014 HC SURGERY LEVEL 4 ADDTL 15MIN: Performed by: OBSTETRICS & GYNECOLOGY

## 2024-05-20 PROCEDURE — 2500000003 HC RX 250 WO HCPCS: Performed by: ANESTHESIOLOGY

## 2024-05-20 PROCEDURE — 3600000004 HC SURGERY LEVEL 4 BASE: Performed by: OBSTETRICS & GYNECOLOGY

## 2024-05-20 PROCEDURE — 3700000000 HC ANESTHESIA ATTENDED CARE: Performed by: OBSTETRICS & GYNECOLOGY

## 2024-05-20 PROCEDURE — 2720000010 HC SURG SUPPLY STERILE: Performed by: OBSTETRICS & GYNECOLOGY

## 2024-05-20 RX ORDER — ONDANSETRON 2 MG/ML
4 INJECTION INTRAMUSCULAR; INTRAVENOUS ONCE
Status: COMPLETED | OUTPATIENT
Start: 2024-05-20 | End: 2024-05-20

## 2024-05-20 RX ORDER — LIDOCAINE HYDROCHLORIDE 20 MG/ML
INJECTION, SOLUTION EPIDURAL; INFILTRATION; INTRACAUDAL; PERINEURAL PRN
Status: DISCONTINUED | OUTPATIENT
Start: 2024-05-20 | End: 2024-05-20 | Stop reason: SDUPTHER

## 2024-05-20 RX ORDER — PROPOFOL 10 MG/ML
INJECTION, EMULSION INTRAVENOUS PRN
Status: DISCONTINUED | OUTPATIENT
Start: 2024-05-20 | End: 2024-05-20 | Stop reason: SDUPTHER

## 2024-05-20 RX ORDER — OXYCODONE HYDROCHLORIDE 5 MG/1
5 TABLET ORAL
Status: COMPLETED | OUTPATIENT
Start: 2024-05-20 | End: 2024-05-20

## 2024-05-20 RX ORDER — IBUPROFEN 800 MG/1
800 TABLET ORAL EVERY 8 HOURS PRN
Qty: 60 TABLET | Refills: 1 | Status: SHIPPED | OUTPATIENT
Start: 2024-05-20

## 2024-05-20 RX ORDER — FENTANYL CITRATE 50 UG/ML
INJECTION, SOLUTION INTRAMUSCULAR; INTRAVENOUS PRN
Status: DISCONTINUED | OUTPATIENT
Start: 2024-05-20 | End: 2024-05-20 | Stop reason: SDUPTHER

## 2024-05-20 RX ORDER — KETAMINE HYDROCHLORIDE 50 MG/ML
INJECTION, SOLUTION INTRAMUSCULAR; INTRAVENOUS PRN
Status: DISCONTINUED | OUTPATIENT
Start: 2024-05-20 | End: 2024-05-20 | Stop reason: SDUPTHER

## 2024-05-20 RX ORDER — NEOSTIGMINE METHYLSULFATE 1 MG/ML
INJECTION, SOLUTION INTRAVENOUS PRN
Status: DISCONTINUED | OUTPATIENT
Start: 2024-05-20 | End: 2024-05-20 | Stop reason: SDUPTHER

## 2024-05-20 RX ORDER — FAMOTIDINE 20 MG/1
20 TABLET, FILM COATED ORAL ONCE
Status: COMPLETED | OUTPATIENT
Start: 2024-05-20 | End: 2024-05-20

## 2024-05-20 RX ORDER — ROCURONIUM BROMIDE 10 MG/ML
INJECTION, SOLUTION INTRAVENOUS PRN
Status: DISCONTINUED | OUTPATIENT
Start: 2024-05-20 | End: 2024-05-20 | Stop reason: SDUPTHER

## 2024-05-20 RX ORDER — LIDOCAINE HYDROCHLORIDE 10 MG/ML
1 INJECTION, SOLUTION INFILTRATION; PERINEURAL
Status: COMPLETED | OUTPATIENT
Start: 2024-05-20 | End: 2024-05-20

## 2024-05-20 RX ORDER — ACETAMINOPHEN 500 MG
1000 TABLET ORAL ONCE
Status: COMPLETED | OUTPATIENT
Start: 2024-05-20 | End: 2024-05-20

## 2024-05-20 RX ORDER — ONDANSETRON 2 MG/ML
INJECTION INTRAMUSCULAR; INTRAVENOUS PRN
Status: DISCONTINUED | OUTPATIENT
Start: 2024-05-20 | End: 2024-05-20 | Stop reason: SDUPTHER

## 2024-05-20 RX ORDER — SODIUM CHLORIDE, SODIUM LACTATE, POTASSIUM CHLORIDE, CALCIUM CHLORIDE 600; 310; 30; 20 MG/100ML; MG/100ML; MG/100ML; MG/100ML
INJECTION, SOLUTION INTRAVENOUS CONTINUOUS
Status: DISCONTINUED | OUTPATIENT
Start: 2024-05-20 | End: 2024-05-20 | Stop reason: HOSPADM

## 2024-05-20 RX ORDER — DEXAMETHASONE SODIUM PHOSPHATE 10 MG/ML
INJECTION INTRAMUSCULAR; INTRAVENOUS PRN
Status: DISCONTINUED | OUTPATIENT
Start: 2024-05-20 | End: 2024-05-20 | Stop reason: SDUPTHER

## 2024-05-20 RX ORDER — NALOXONE HYDROCHLORIDE 0.4 MG/ML
INJECTION, SOLUTION INTRAMUSCULAR; INTRAVENOUS; SUBCUTANEOUS PRN
Status: DISCONTINUED | OUTPATIENT
Start: 2024-05-20 | End: 2024-05-20 | Stop reason: HOSPADM

## 2024-05-20 RX ORDER — ACETAMINOPHEN 500 MG
1000 TABLET ORAL EVERY 6 HOURS PRN
COMMUNITY

## 2024-05-20 RX ORDER — METOPROLOL TARTRATE 1 MG/ML
INJECTION, SOLUTION INTRAVENOUS PRN
Status: DISCONTINUED | OUTPATIENT
Start: 2024-05-20 | End: 2024-05-20 | Stop reason: SDUPTHER

## 2024-05-20 RX ORDER — MIDAZOLAM HYDROCHLORIDE 1 MG/ML
INJECTION INTRAMUSCULAR; INTRAVENOUS PRN
Status: DISCONTINUED | OUTPATIENT
Start: 2024-05-20 | End: 2024-05-20 | Stop reason: SDUPTHER

## 2024-05-20 RX ORDER — PROCHLORPERAZINE EDISYLATE 5 MG/ML
5 INJECTION INTRAMUSCULAR; INTRAVENOUS
Status: COMPLETED | OUTPATIENT
Start: 2024-05-20 | End: 2024-05-20

## 2024-05-20 RX ORDER — SCOLOPAMINE TRANSDERMAL SYSTEM 1 MG/1
1 PATCH, EXTENDED RELEASE TRANSDERMAL ONCE
Status: DISCONTINUED | OUTPATIENT
Start: 2024-05-20 | End: 2024-05-20 | Stop reason: HOSPADM

## 2024-05-20 RX ORDER — PHENYLEPHRINE HYDROCHLORIDE 10 MG/ML
INJECTION INTRAVENOUS PRN
Status: DISCONTINUED | OUTPATIENT
Start: 2024-05-20 | End: 2024-05-20 | Stop reason: HOSPADM

## 2024-05-20 RX ORDER — PROMETHAZINE HYDROCHLORIDE 25 MG/1
25 TABLET ORAL 4 TIMES DAILY PRN
Qty: 20 TABLET | Refills: 0 | Status: SHIPPED | OUTPATIENT
Start: 2024-05-20 | End: 2024-05-27

## 2024-05-20 RX ORDER — OXYCODONE HYDROCHLORIDE 5 MG/1
5 TABLET ORAL EVERY 4 HOURS PRN
Qty: 30 TABLET | Refills: 0 | Status: SHIPPED | OUTPATIENT
Start: 2024-05-20 | End: 2024-05-25

## 2024-05-20 RX ORDER — KETOROLAC TROMETHAMINE 30 MG/ML
INJECTION, SOLUTION INTRAMUSCULAR; INTRAVENOUS PRN
Status: DISCONTINUED | OUTPATIENT
Start: 2024-05-20 | End: 2024-05-20 | Stop reason: SDUPTHER

## 2024-05-20 RX ORDER — BUPIVACAINE HYDROCHLORIDE AND EPINEPHRINE 2.5; 5 MG/ML; UG/ML
INJECTION, SOLUTION EPIDURAL; INFILTRATION; INTRACAUDAL; PERINEURAL PRN
Status: DISCONTINUED | OUTPATIENT
Start: 2024-05-20 | End: 2024-05-20 | Stop reason: HOSPADM

## 2024-05-20 RX ORDER — EPHEDRINE SULFATE/0.9% NACL/PF 50 MG/5 ML
SYRINGE (ML) INTRAVENOUS PRN
Status: DISCONTINUED | OUTPATIENT
Start: 2024-05-20 | End: 2024-05-20 | Stop reason: SDUPTHER

## 2024-05-20 RX ORDER — ONDANSETRON 4 MG/1
4 TABLET, FILM COATED ORAL DAILY PRN
Qty: 30 TABLET | Refills: 0 | Status: SHIPPED | OUTPATIENT
Start: 2024-05-20

## 2024-05-20 RX ADMIN — ONDANSETRON 4 MG: 2 INJECTION INTRAMUSCULAR; INTRAVENOUS at 15:31

## 2024-05-20 RX ADMIN — METOPROLOL TARTRATE 1 MG: 1 INJECTION, SOLUTION INTRAVENOUS at 11:16

## 2024-05-20 RX ADMIN — ACETAMINOPHEN 1000 MG: 500 TABLET, FILM COATED ORAL at 09:00

## 2024-05-20 RX ADMIN — OXYCODONE 5 MG: 5 TABLET ORAL at 13:03

## 2024-05-20 RX ADMIN — FENTANYL CITRATE 50 MCG: 50 INJECTION, SOLUTION INTRAMUSCULAR; INTRAVENOUS at 10:14

## 2024-05-20 RX ADMIN — MIDAZOLAM 2 MG: 1 INJECTION INTRAMUSCULAR; INTRAVENOUS at 10:05

## 2024-05-20 RX ADMIN — FAMOTIDINE 20 MG: 20 TABLET, FILM COATED ORAL at 09:35

## 2024-05-20 RX ADMIN — Medication 10 MG: at 11:28

## 2024-05-20 RX ADMIN — DEXAMETHASONE SODIUM PHOSPHATE 4 MG: 10 INJECTION INTRAMUSCULAR; INTRAVENOUS at 10:21

## 2024-05-20 RX ADMIN — FENTANYL CITRATE 50 MCG: 50 INJECTION, SOLUTION INTRAMUSCULAR; INTRAVENOUS at 10:39

## 2024-05-20 RX ADMIN — SODIUM CHLORIDE, POTASSIUM CHLORIDE, SODIUM LACTATE AND CALCIUM CHLORIDE: 600; 310; 30; 20 INJECTION, SOLUTION INTRAVENOUS at 09:00

## 2024-05-20 RX ADMIN — Medication 15 MG: at 10:37

## 2024-05-20 RX ADMIN — KETAMINE HYDROCHLORIDE 20 MG: 50 INJECTION, SOLUTION INTRAMUSCULAR; INTRAVENOUS at 10:14

## 2024-05-20 RX ADMIN — LIDOCAINE HYDROCHLORIDE 1 ML: 10 INJECTION, SOLUTION INFILTRATION; PERINEURAL at 09:00

## 2024-05-20 RX ADMIN — PROPOFOL 200 MG: 10 INJECTION, EMULSION INTRAVENOUS at 10:14

## 2024-05-20 RX ADMIN — PROCHLORPERAZINE EDISYLATE 5 MG: 5 INJECTION INTRAMUSCULAR; INTRAVENOUS at 12:39

## 2024-05-20 RX ADMIN — ROCURONIUM BROMIDE 40 MG: 10 INJECTION, SOLUTION INTRAVENOUS at 10:14

## 2024-05-20 RX ADMIN — SODIUM CHLORIDE 150 MG: 9 INJECTION, SOLUTION INTRAVENOUS at 09:01

## 2024-05-20 RX ADMIN — Medication 10 MG: at 11:32

## 2024-05-20 RX ADMIN — ONDANSETRON 4 MG: 2 INJECTION INTRAMUSCULAR; INTRAVENOUS at 10:21

## 2024-05-20 RX ADMIN — HYDROMORPHONE HYDROCHLORIDE 0.5 MG: 0.5 INJECTION, SOLUTION INTRAMUSCULAR; INTRAVENOUS; SUBCUTANEOUS at 12:44

## 2024-05-20 RX ADMIN — Medication 3 MG: at 12:01

## 2024-05-20 RX ADMIN — Medication 2000 MG: at 10:05

## 2024-05-20 RX ADMIN — KETOROLAC TROMETHAMINE 30 MG: 30 INJECTION, SOLUTION INTRAMUSCULAR; INTRAVENOUS at 11:55

## 2024-05-20 RX ADMIN — LIDOCAINE HYDROCHLORIDE 100 MG: 20 INJECTION, SOLUTION EPIDURAL; INFILTRATION; INTRACAUDAL; PERINEURAL at 10:14

## 2024-05-20 ASSESSMENT — PAIN DESCRIPTION - FREQUENCY: FREQUENCY: CONTINUOUS

## 2024-05-20 ASSESSMENT — PAIN SCALES - GENERAL
PAINLEVEL_OUTOF10: 5
PAINLEVEL_OUTOF10: 7
PAINLEVEL_OUTOF10: 5

## 2024-05-20 ASSESSMENT — PAIN DESCRIPTION - ONSET: ONSET: GRADUAL

## 2024-05-20 ASSESSMENT — PAIN DESCRIPTION - DESCRIPTORS: DESCRIPTORS: BURNING

## 2024-05-20 ASSESSMENT — PAIN - FUNCTIONAL ASSESSMENT: PAIN_FUNCTIONAL_ASSESSMENT: 0-10

## 2024-05-20 ASSESSMENT — PAIN DESCRIPTION - PAIN TYPE: TYPE: SURGICAL PAIN

## 2024-05-20 ASSESSMENT — PAIN DESCRIPTION - LOCATION: LOCATION: PERINEUM

## 2024-05-20 NOTE — BRIEF OP NOTE
Brief Postoperative Note      Patient: Marco Antonio Tate  YOB: 1985  MRN: [id number]    Date of Procedure: 5/20/2024    Pre-Op Diagnosis Codes:     * Pelvic pain in female [R10.2]     * Dysmenorrhea [N94.6]     * Dyspareunia, female [N94.10]     * Cervical intraepithelial neoplasia I [N87.0]    Post-Op Diagnosis: Same + perineal skin tags and perianal skin tags       Procedure(s):  ERAS LAPAROSCOPIC ASSISTED VAGINAL HYSTERECTOMY WITH LEFT SALPINGECTOMY AND CYSTOSCOPY; PERINEAL BIOPSY, PERIRECTAL BIOPSY    Surgeon(s):  Cindy Foreman MD    Assistant:  Aleyda Mendez CST    Anesthesia: General    Estimated Blood Loss (mL): 100    Complications: None    Specimens:   ID Type Source Tests Collected by Time Destination   A : UTERUS, LEFT TUBE Tissue Uterus SURGICAL PATHOLOGY Cindy Foreman MD 5/20/2024 1133    B : PERINEAL BIOPSY Tissue Tissue SURGICAL PATHOLOGY Cindy Foreman MD 5/20/2024 1200    C : PERIRECTAL BIOPSY Tissue Tissue SURGICAL PATHOLOGY Cindy Foreman MD 5/20/2024 1205        Implants:  * No implants in log *      Drains:   Urinary Catheter 05/20/24 2 Way;[place] (Active)       Findings:  Infection Present At Time Of Surgery (PATOS) (choose all levels that have infection present):  No infection present  Other Findings: Normal external female genitalia, multiple skin tags in midline of perineum present as well as perirectal - decision made to biopsy/remove at the end of the procedure. Normal appendix seen. Uterus was normal with scarring of the AHSAN to the bladder from prior cesareans. Omental adhesion on the right to the anterior pelvic peritoneum adjacent to the bladder noted and taken down. BL ovaries appeared normal. Left fallopian tube was normal in appearance. The right fallopian tube was previously removed and findings supported that history today. Bladder intact with cytoscopy at the end of the procedure. Efflux noted from BL ureteral orifices. Small nodules

## 2024-05-20 NOTE — ANESTHESIA POSTPROCEDURE EVALUATION
Department of Anesthesiology  Postprocedure Note    Patient: Marco Antonio Tate  MRN: [id number]  YOB: 1985  Date of evaluation: 5/20/2024    Procedure Summary       Date: 05/20/24 Room / Location: [facility] MAIN OR 03 / [facility] MAIN OR    Anesthesia Start: 1005 Anesthesia Stop: 1222    Procedure: ERAS LAPAROSCOPIC ASSISTED VAGINAL HYSTERECTOMY WITH LEFT SALPINGECTOMY AND CYSTOSCOPY; PERINEAL BIOPSY, PERIRECTAL BIOPSY (Bilateral: Abdomen) Diagnosis:       Pelvic pain in female      Dysmenorrhea      Dyspareunia, female      Cervical intraepithelial neoplasia I      (Pelvic pain in female [R10.2])      (Dysmenorrhea [N94.6])      (Dyspareunia, female [N94.10])      (Cervical intraepithelial neoplasia I [N87.0])    Surgeons: Cindy Foreman MD Responsible Provider: Jeremy Booth Jr., MD    Anesthesia Type: general ASA Status: 1            Anesthesia Type: No value filed.    Olga Phase I: Olga Score: 8    Olga Phase II:      Anesthesia Post Evaluation    Patient location during evaluation: PACU  Patient participation: complete - patient participated  Level of consciousness: awake  Pain score: 0  Airway patency: patent  Nausea & Vomiting: no nausea and no vomiting  Cardiovascular status: blood pressure returned to baseline and hemodynamically stable  Respiratory status: acceptable, spontaneous ventilation and nonlabored ventilation  Hydration status: euvolemic  Multimodal analgesia pain management approach  Pain management: adequate    No notable events documented.

## 2024-05-20 NOTE — ANESTHESIA PRE PROCEDURE
Temp:  97.9 °F (36.6 °C)   TempSrc:  Temporal   SpO2:  100%   Weight: 61.2 kg (135 lb) 66.6 kg (146 lb 12.8 oz)   Height: 1.575 m (5' 2\") 1.575 m (5' 2.01\")                                              BP Readings from Last 3 Encounters:   05/20/24 111/67   05/13/24 132/74   04/10/24 110/70       NPO Status:                                                                                 BMI:   Wt Readings from Last 3 Encounters:   05/20/24 66.6 kg (146 lb 12.8 oz)   05/13/24 67.1 kg (148 lb)   04/10/24 66.2 kg (146 lb)     Body mass index is 26.84 kg/m².    CBC:   Lab Results   Component Value Date/Time    HGB 15.5 05/01/2024 10:30 AM       CMP: No results found for: \"NA\", \"K\", \"CL\", \"CO2\", \"BUN\", \"CREATININE\", \"GFRAA\", \"AGRATIO\", \"LABGLOM\", \"GLUCOSE\", \"GLU\", \"PROT\", \"CALCIUM\", \"BILITOT\", \"ALKPHOS\", \"AST\", \"ALT\"    POC Tests: No results for input(s): \"POCGLU\", \"POCNA\", \"POCK\", \"POCCL\", \"POCBUN\", \"POCHEMO\", \"POCHCT\" in the last 72 hours.    Coags: No results found for: \"PROTIME\", \"INR\", \"APTT\"    HCG (If Applicable):   Lab Results   Component Value Date    HCGQUANT 4349 (H) 02/16/2009        ABGs: No results found for: \"PHART\", \"PO2ART\", \"ERL5OTP\", \"QKB0MZK\", \"BEART\", \"H9GBRTQV\"     Type & Screen (If Applicable):  No results found for: \"LABABO\"    Drug/Infectious Status (If Applicable):  No results found for: \"HIV\", \"HEPCAB\"    COVID-19 Screening (If Applicable): No results found for: \"COVID19\"        Anesthesia Evaluation  Patient summary reviewed  Airway: Mallampati: II  TM distance: >3 FB   Neck ROM: full  Mouth opening: > = 3 FB   Dental:          Pulmonary:Negative Pulmonary ROS and normal exam                               Cardiovascular:  Exercise tolerance: good (>4 METS)                    Neuro/Psych:   Negative Neuro/Psych ROS              GI/Hepatic/Renal: Neg GI/Hepatic/Renal ROS            Endo/Other: Negative Endo/Other ROS                    Abdominal:             Vascular: negative vascular

## 2024-05-20 NOTE — DISCHARGE INSTRUCTIONS
Laparoscopically Assisted Vaginal Hysterectomy: What to Expect at Home  Your Recovery     Laparoscopically assisted vaginal hysterectomy (LAVH) removes the uterus through the vagina. Your doctor used a lighted tube and surgical tools inserted through small cuts (incisions) in the belly. Then the doctor made a small cut in the vagina. Your uterus was taken out through this cut.  You can expect to feel better and stronger each day. But you might need pain medicine for a week or two. After a laparoscopy, you may have shoulder pain. This is caused by the air your doctor put in your belly to help see your organs better. The pain may last for a day or two. You may get tired easily or have less energy than usual. This may last for several weeks after surgery. And you also may have light vaginal bleeding for a few weeks.  It's important to avoid lifting while you are recovering so that you can heal. It may take about 4 to 6 weeks to fully recover. The recovery time may be shorter for some people.  This care sheet gives you a general idea about how long it will take for you to recover. But each person recovers at a different pace. Follow the steps below to get better as quickly as possible.  How can you care for yourself at home?  Activity    Rest when you feel tired. Getting enough sleep will help you recover.     Try to walk each day. Start by walking a little more than you did the day before. Bit by bit, increase the amount you walk. Walking boosts blood flow and helps prevent pneumonia and constipation.     Avoid lifting anything that would make you strain. This may include heavy grocery bags and milk containers, a heavy briefcase or backpack, cat litter or dog food bags, a vacuum , or a child.     Avoid strenuous activities, such as biking, jogging, weight lifting, or aerobic exercise, until your doctor says it is okay.     You may shower. Pat the cut (incision) dry. Do not take a bath for the first 2 weeks,

## 2024-05-21 NOTE — OP NOTE
scalpel and pick ups to excise an area of skin tags in the midline perineum which was reapproximated with a 4-0 vicryl. Small area of skin tags excised on the left aspect of the patient's anus and this was reapproximated with a 4-0 vicryl as well. Patient tolerated the procedure well. Multiple scattered small skin tags were cauterized with a needle-tipped bovie. Sponge, lap, and needle counts were correct x2. The patient was taken to recovery in stable condition.        Cindy Foreman MD FACOG      Electronically signed by Cindy Foreman MD

## 2024-05-21 NOTE — FLOWSHEET NOTE
Pain tolerable with pain medication. Voiding appropriately with some light bleeding as expected. Denies vomiting and fevers but does have nausea. She has been taking her antiemetic. Able to keep down Ensure and Mac & Cheese.

## 2024-05-28 ENCOUNTER — TELEPHONE (OUTPATIENT)
Dept: OBGYN CLINIC | Age: 39
End: 2024-05-28

## 2024-05-28 NOTE — TELEPHONE ENCOUNTER
Pt had LAVH on 5/20/24 with . Pt LM c/o redness, swelling, itching, and blisters around puncture sites, light bleeding (changing a pad every 2 hours) and cramping. , does she need to be seen or can use hydrocortisone on the sites?

## 2024-05-29 ENCOUNTER — HOSPITAL ENCOUNTER (INPATIENT)
Age: 39
LOS: 1 days | Discharge: HOME | DRG: 863 | End: 2024-05-31
Attending: OBSTETRICS & GYNECOLOGY | Admitting: OBSTETRICS & GYNECOLOGY
Payer: COMMERCIAL

## 2024-05-29 ENCOUNTER — APPOINTMENT (OUTPATIENT)
Dept: CT IMAGING | Age: 39
DRG: 863 | End: 2024-05-29
Payer: COMMERCIAL

## 2024-05-29 ENCOUNTER — TELEPHONE (OUTPATIENT)
Dept: OBGYN CLINIC | Age: 39
End: 2024-05-29

## 2024-05-29 DIAGNOSIS — T81.49XA POSTOPERATIVE ABSCESS OF PELVIS IN FEMALE: ICD-10-CM

## 2024-05-29 DIAGNOSIS — R10.84 GENERALIZED ABDOMINAL PAIN: ICD-10-CM

## 2024-05-29 DIAGNOSIS — R19.7 NAUSEA VOMITING AND DIARRHEA: ICD-10-CM

## 2024-05-29 DIAGNOSIS — N73.9 POSTOPERATIVE ABSCESS OF PELVIS IN FEMALE: ICD-10-CM

## 2024-05-29 DIAGNOSIS — N73.9 PELVIC ABSCESS IN FEMALE: Primary | ICD-10-CM

## 2024-05-29 DIAGNOSIS — R11.2 NAUSEA VOMITING AND DIARRHEA: ICD-10-CM

## 2024-05-29 PROBLEM — R79.89 ELEVATED LFTS: Status: ACTIVE | Noted: 2024-05-29

## 2024-05-29 LAB
ALBUMIN SERPL-MCNC: 3.4 G/DL (ref 3.5–5)
ALBUMIN/GLOB SERPL: 1.1 (ref 1–1.9)
ALP SERPL-CCNC: 114 U/L (ref 35–104)
ALT SERPL-CCNC: 122 U/L (ref 12–65)
ANION GAP SERPL CALC-SCNC: 12 MMOL/L (ref 9–18)
APPEARANCE UR: ABNORMAL
AST SERPL-CCNC: 40 U/L (ref 15–37)
BACTERIA URNS QL MICRO: ABNORMAL /HPF
BASOPHILS # BLD: 0.1 K/UL (ref 0–0.2)
BASOPHILS NFR BLD: 1 % (ref 0–2)
BILIRUB SERPL-MCNC: 0.3 MG/DL (ref 0–1.2)
BILIRUB UR QL: NEGATIVE
BUN SERPL-MCNC: 8 MG/DL (ref 6–23)
CALCIUM SERPL-MCNC: 9.1 MG/DL (ref 8.8–10.2)
CHLORIDE SERPL-SCNC: 105 MMOL/L (ref 98–107)
CO2 SERPL-SCNC: 24 MMOL/L (ref 20–28)
COLOR UR: ABNORMAL
CREAT SERPL-MCNC: 0.7 MG/DL (ref 0.6–1.1)
DIFFERENTIAL METHOD BLD: ABNORMAL
EOSINOPHIL # BLD: 0.2 K/UL (ref 0–0.8)
EOSINOPHIL NFR BLD: 3 % (ref 0.5–7.8)
EPI CELLS #/AREA URNS HPF: ABNORMAL /HPF
ERYTHROCYTE [DISTWIDTH] IN BLOOD BY AUTOMATED COUNT: 12.2 % (ref 11.9–14.6)
GLOBULIN SER CALC-MCNC: 3.2 G/DL (ref 2.3–3.5)
GLUCOSE BLD STRIP.AUTO-MCNC: 85 MG/DL (ref 65–100)
GLUCOSE SERPL-MCNC: 82 MG/DL (ref 70–99)
GLUCOSE UR STRIP.AUTO-MCNC: NEGATIVE MG/DL
HCG UR QL: NEGATIVE
HCT VFR BLD AUTO: 42.7 % (ref 35.8–46.3)
HGB BLD-MCNC: 14.3 G/DL (ref 11.7–15.4)
HGB UR QL STRIP: ABNORMAL
IMM GRANULOCYTES # BLD AUTO: 0 K/UL (ref 0–0.5)
IMM GRANULOCYTES NFR BLD AUTO: 0 % (ref 0–5)
KETONES UR QL STRIP.AUTO: ABNORMAL MG/DL
LEUKOCYTE ESTERASE UR QL STRIP.AUTO: ABNORMAL
LIPASE SERPL-CCNC: 25 U/L (ref 13–60)
LYMPHOCYTES # BLD: 2 K/UL (ref 0.5–4.6)
LYMPHOCYTES NFR BLD: 26 % (ref 13–44)
MCH RBC QN AUTO: 31.5 PG (ref 26.1–32.9)
MCHC RBC AUTO-ENTMCNC: 33.5 G/DL (ref 31.4–35)
MCV RBC AUTO: 94.1 FL (ref 82–102)
MONOCYTES # BLD: 0.9 K/UL (ref 0.1–1.3)
MONOCYTES NFR BLD: 12 % (ref 4–12)
MUCOUS THREADS URNS QL MICRO: ABNORMAL /LPF
NEUTS SEG # BLD: 4.5 K/UL (ref 1.7–8.2)
NEUTS SEG NFR BLD: 59 % (ref 43–78)
NITRITE UR QL STRIP.AUTO: NEGATIVE
NRBC # BLD: 0 K/UL (ref 0–0.2)
OTHER OBSERVATIONS: ABNORMAL
PH UR STRIP: 5.5 (ref 5–9)
PLATELET # BLD AUTO: 299 K/UL (ref 150–450)
PMV BLD AUTO: 9.2 FL (ref 9.4–12.3)
POTASSIUM SERPL-SCNC: 4 MMOL/L (ref 3.5–5.1)
PROT SERPL-MCNC: 6.6 G/DL (ref 6.3–8.2)
PROT UR STRIP-MCNC: NEGATIVE MG/DL
RBC # BLD AUTO: 4.54 M/UL (ref 4.05–5.2)
RBC #/AREA URNS HPF: ABNORMAL /HPF
SERVICE CMNT-IMP: NORMAL
SODIUM SERPL-SCNC: 141 MMOL/L (ref 136–145)
SP GR UR REFRACTOMETRY: 1.02 (ref 1–1.02)
UROBILINOGEN UR QL STRIP.AUTO: 0.2 EU/DL (ref 0.2–1)
WBC # BLD AUTO: 7.7 K/UL (ref 4.3–11.1)
WBC URNS QL MICRO: ABNORMAL /HPF

## 2024-05-29 PROCEDURE — 87086 URINE CULTURE/COLONY COUNT: CPT

## 2024-05-29 PROCEDURE — 82962 GLUCOSE BLOOD TEST: CPT

## 2024-05-29 PROCEDURE — 80053 COMPREHEN METABOLIC PANEL: CPT

## 2024-05-29 PROCEDURE — 99222 1ST HOSP IP/OBS MODERATE 55: CPT | Performed by: OBSTETRICS & GYNECOLOGY

## 2024-05-29 PROCEDURE — 96375 TX/PRO/DX INJ NEW DRUG ADDON: CPT

## 2024-05-29 PROCEDURE — 96365 THER/PROPH/DIAG IV INF INIT: CPT

## 2024-05-29 PROCEDURE — 2580000003 HC RX 258: Performed by: OBSTETRICS & GYNECOLOGY

## 2024-05-29 PROCEDURE — 74177 CT ABD & PELVIS W/CONTRAST: CPT

## 2024-05-29 PROCEDURE — 81025 URINE PREGNANCY TEST: CPT

## 2024-05-29 PROCEDURE — 6360000002 HC RX W HCPCS: Performed by: OBSTETRICS & GYNECOLOGY

## 2024-05-29 PROCEDURE — 6360000004 HC RX CONTRAST MEDICATION

## 2024-05-29 PROCEDURE — 83690 ASSAY OF LIPASE: CPT

## 2024-05-29 PROCEDURE — G0378 HOSPITAL OBSERVATION PER HR: HCPCS

## 2024-05-29 PROCEDURE — 81001 URINALYSIS AUTO W/SCOPE: CPT

## 2024-05-29 PROCEDURE — 87040 BLOOD CULTURE FOR BACTERIA: CPT

## 2024-05-29 PROCEDURE — 6360000002 HC RX W HCPCS

## 2024-05-29 PROCEDURE — 99285 EMERGENCY DEPT VISIT HI MDM: CPT

## 2024-05-29 PROCEDURE — 85025 COMPLETE CBC W/AUTO DIFF WBC: CPT

## 2024-05-29 RX ORDER — KETOROLAC TROMETHAMINE 30 MG/ML
30 INJECTION, SOLUTION INTRAMUSCULAR; INTRAVENOUS ONCE
Status: COMPLETED | OUTPATIENT
Start: 2024-05-29 | End: 2024-05-29

## 2024-05-29 RX ADMIN — KETOROLAC TROMETHAMINE 30 MG: 30 INJECTION, SOLUTION INTRAMUSCULAR at 20:41

## 2024-05-29 RX ADMIN — IOPAMIDOL 100 ML: 755 INJECTION, SOLUTION INTRAVENOUS at 21:08

## 2024-05-29 RX ADMIN — CEFTRIAXONE SODIUM 2000 MG: 2 INJECTION, POWDER, FOR SOLUTION INTRAMUSCULAR; INTRAVENOUS at 23:19

## 2024-05-29 ASSESSMENT — PAIN DESCRIPTION - LOCATION
LOCATION: ABDOMEN
LOCATION: ABDOMEN

## 2024-05-29 ASSESSMENT — PAIN - FUNCTIONAL ASSESSMENT: PAIN_FUNCTIONAL_ASSESSMENT: 0-10

## 2024-05-29 ASSESSMENT — PAIN SCALES - GENERAL: PAINLEVEL_OUTOF10: 0

## 2024-05-29 ASSESSMENT — PAIN DESCRIPTION - ORIENTATION: ORIENTATION: LOWER

## 2024-05-29 ASSESSMENT — LIFESTYLE VARIABLES
HOW OFTEN DO YOU HAVE A DRINK CONTAINING ALCOHOL: MONTHLY OR LESS
HOW MANY STANDARD DRINKS CONTAINING ALCOHOL DO YOU HAVE ON A TYPICAL DAY: 1 OR 2

## 2024-05-29 NOTE — ED TRIAGE NOTES
Patient to triage with c/o having a hysterectomy on 5/20/2024. Pt states for the last few days she has been having sharp abdominal pain/ nausea/ diarrhea. Pt also states some how a hemorrhoid got nicked during surgery and has been bleeding and painful since.

## 2024-05-29 NOTE — TELEPHONE ENCOUNTER
Surgery Mon, some bleeding on Saturday with cramping - just when wiping and with panty liner. However pt reports that she has been experiencing bowel pain, at times severe, followed by explosive diarrhea as well as bleeding from a tear or cut in her hemorrhoid. Advised pt to go to [facility] ED for evaluation. Pt verbalized understanding and will check back with us after the hospital.

## 2024-05-29 NOTE — ED PROVIDER NOTES
Emergency Department Provider Note       PCP: Albert Spain III, MD   Age: 39 y.o.   Sex: female     DISPOSITION Decision To Admit 2024 10:30:59 PM       ICD-10-CM    1. Pelvic abscess in female  N73.9       2. Generalized abdominal pain  R10.84       3. Nausea vomiting and diarrhea  R11.2     R19.7       4. Postoperative abscess of pelvis in female  T81.49XA     N73.9           Medical Decision Making     In summary  female presented to emergency department status post hysterectomy 9 days ago.  Surgery performed by [facility].  Associated nausea vomiting and diarrhea with generalized abdominal pain.  Will pursue lab and CT imaging workup.  Lab work is generally unremarkable with exception to bacteriuria.  Correlation of urinary urgency and dysuria upon further questioning.  Will pursue urine culture.    Remainder of labs are unremarkable with exception to some mild elevation of LFTs, unclear etiology.  Total bilirubin within normal limits.  History of cholecystectomy.  CT imaging demonstrates 19 x 20 x 30 mm fluid collection within the pelvis likely due to an abscess.  Additional finding of wall thickening of sigmoid colon and rectum which could be due to infectious versus inflammatory etiologies.    Consulted with on-call OB/GYN hospitalist with [facility] Dr. uQiros.  Patient to be admitted for IV antibiotics and observation with Dr. De Oliveira service.  Will obtain blood culture and begin on IV Zosyn for addressing finding of pelvic abscess.  Patient is agreeable to hospital admission with OB/GYN services.  Discussed treatment care plan with my attending physician Dr. Cross.     1 acute complicated illness or injury.  Parental controlled substances given in the ED.  Discussion with external consultants.  Shared medical decision making was utilized in creating the patients health plan today.    I independently ordered and reviewed each unique test.       I interpreted  PELVIS:    Intraperitoneal space:  There is a 19 x 20 x 30 mm fluid collection  within the pelvis likely due to an abscess.  No free air.    Bones/joints:  No acute fracture.  No dislocation.    Soft tissues:  Unremarkable.    Vasculature:  Unremarkable.  No abdominal aortic aneurysm.    Lymph nodes:  Unremarkable.  No enlarged lymph nodes.      Impression    1.  There is a 19 x 20 x 30 mm fluid collection within the pelvis  likely due to an abscess.  2.  There is wall thickening of the sigmoid colon and rectum which  could be due to infectious versus inflammatory etiologies.      Automatic exposure control was used as a dose lowering technique.    Radiation Dose: CTDI is 12.17 mGy. DLP is 620.68 mGy-cm.    Contrast Type: iopamidol (ISOVUE-370) 76 . Contrast Volume: 100 mL    Report signed on 05/29/2024 (21:37 Eastern Time)  Signed by: Cornelio Barakat M.D.  Reading Location: 396   CBC with Auto Differential   Result Value Ref Range    WBC 7.7 4.3 - 11.1 K/uL    RBC 4.54 4.05 - 5.2 M/uL    Hemoglobin 14.3 11.7 - 15.4 g/dL    Hematocrit 42.7 35.8 - 46.3 %    MCV 94.1 82.0 - 102.0 FL    MCH 31.5 26.1 - 32.9 PG    MCHC 33.5 31.4 - 35.0 g/dL    RDW 12.2 11.9 - 14.6 %    Platelets 299 150 - 450 K/uL    MPV 9.2 (L) 9.4 - 12.3 FL    nRBC 0.00 0.0 - 0.2 K/uL    Differential Type AUTOMATED      Neutrophils % 59 43 - 78 %    Lymphocytes % 26 13 - 44 %    Monocytes % 12 4.0 - 12.0 %    Eosinophils % 3 0.5 - 7.8 %    Basophils % 1 0.0 - 2.0 %    Immature Granulocytes % 0 0.0 - 5.0 %    Neutrophils Absolute 4.5 1.7 - 8.2 K/UL    Lymphocytes Absolute 2.0 0.5 - 4.6 K/UL    Monocytes Absolute 0.9 0.1 - 1.3 K/UL    Eosinophils Absolute 0.2 0.0 - 0.8 K/UL    Basophils Absolute 0.1 0.0 - 0.2 K/UL    Immature Granulocytes Absolute 0.0 0.0 - 0.5 K/UL   Comprehensive Metabolic Panel   Result Value Ref Range    Sodium 141 136 - 145 mmol/L    Potassium 4.0 3.5 - 5.1 mmol/L    Chloride 105 98 - 107 mmol/L    CO2 24 20 - 28 mmol/L    Anion

## 2024-05-30 PROBLEM — O34.219 PREVIOUS CESAREAN DELIVERY AFFECTING PREGNANCY: Status: RESOLVED | Noted: 2017-11-29 | Resolved: 2024-05-30

## 2024-05-30 PROBLEM — Z34.90 PREGNANCY: Status: RESOLVED | Noted: 2018-04-23 | Resolved: 2024-05-30

## 2024-05-30 PROBLEM — T81.49XA: Status: ACTIVE | Noted: 2024-05-30

## 2024-05-30 PROBLEM — R11.2 NAUSEA VOMITING AND DIARRHEA: Status: ACTIVE | Noted: 2024-05-30

## 2024-05-30 PROBLEM — N73.9: Status: ACTIVE | Noted: 2024-05-30

## 2024-05-30 PROBLEM — R19.7 NAUSEA VOMITING AND DIARRHEA: Status: ACTIVE | Noted: 2024-05-30

## 2024-05-30 LAB — C. DIFFICILE TOXIN MOLECULAR: NEGATIVE

## 2024-05-30 PROCEDURE — 6360000002 HC RX W HCPCS

## 2024-05-30 PROCEDURE — 96376 TX/PRO/DX INJ SAME DRUG ADON: CPT

## 2024-05-30 PROCEDURE — 6360000002 HC RX W HCPCS: Performed by: OBSTETRICS & GYNECOLOGY

## 2024-05-30 PROCEDURE — 87045 FECES CULTURE AEROBIC BACT: CPT

## 2024-05-30 PROCEDURE — 87046 STOOL CULTR AEROBIC BACT EA: CPT

## 2024-05-30 PROCEDURE — G0378 HOSPITAL OBSERVATION PER HR: HCPCS

## 2024-05-30 PROCEDURE — 96372 THER/PROPH/DIAG INJ SC/IM: CPT

## 2024-05-30 PROCEDURE — 96367 TX/PROPH/DG ADDL SEQ IV INF: CPT

## 2024-05-30 PROCEDURE — 87427 SHIGA-LIKE TOXIN AG IA: CPT

## 2024-05-30 PROCEDURE — 99232 SBSQ HOSP IP/OBS MODERATE 35: CPT | Performed by: OBSTETRICS & GYNECOLOGY

## 2024-05-30 PROCEDURE — 96366 THER/PROPH/DIAG IV INF ADDON: CPT

## 2024-05-30 PROCEDURE — 87899 AGENT NOS ASSAY W/OPTIC: CPT

## 2024-05-30 PROCEDURE — 6370000000 HC RX 637 (ALT 250 FOR IP): Performed by: OBSTETRICS & GYNECOLOGY

## 2024-05-30 PROCEDURE — 2580000003 HC RX 258

## 2024-05-30 PROCEDURE — 2580000003 HC RX 258: Performed by: OBSTETRICS & GYNECOLOGY

## 2024-05-30 PROCEDURE — 87493 C DIFF AMPLIFIED PROBE: CPT

## 2024-05-30 RX ORDER — IBUPROFEN 800 MG/1
800 TABLET ORAL EVERY 8 HOURS PRN
Status: DISCONTINUED | OUTPATIENT
Start: 2024-05-30 | End: 2024-05-31 | Stop reason: HOSPADM

## 2024-05-30 RX ORDER — SODIUM CHLORIDE 0.9 % (FLUSH) 0.9 %
5-40 SYRINGE (ML) INJECTION PRN
Status: DISCONTINUED | OUTPATIENT
Start: 2024-05-30 | End: 2024-05-31 | Stop reason: HOSPADM

## 2024-05-30 RX ORDER — ONDANSETRON 4 MG/1
4 TABLET, ORALLY DISINTEGRATING ORAL EVERY 8 HOURS PRN
Status: DISCONTINUED | OUTPATIENT
Start: 2024-05-30 | End: 2024-05-31 | Stop reason: HOSPADM

## 2024-05-30 RX ORDER — ENOXAPARIN SODIUM 100 MG/ML
40 INJECTION SUBCUTANEOUS DAILY
Status: DISCONTINUED | OUTPATIENT
Start: 2024-05-30 | End: 2024-05-31 | Stop reason: HOSPADM

## 2024-05-30 RX ORDER — POTASSIUM CHLORIDE 7.45 MG/ML
10 INJECTION INTRAVENOUS PRN
Status: DISCONTINUED | OUTPATIENT
Start: 2024-05-30 | End: 2024-05-31 | Stop reason: HOSPADM

## 2024-05-30 RX ORDER — OXYCODONE HYDROCHLORIDE 5 MG/1
5 TABLET ORAL EVERY 4 HOURS PRN
Status: DISCONTINUED | OUTPATIENT
Start: 2024-05-30 | End: 2024-05-31 | Stop reason: HOSPADM

## 2024-05-30 RX ORDER — HYDROCORTISONE 25 MG/G
CREAM TOPICAL 4 TIMES DAILY PRN
Status: DISCONTINUED | OUTPATIENT
Start: 2024-05-30 | End: 2024-05-31 | Stop reason: HOSPADM

## 2024-05-30 RX ORDER — METRONIDAZOLE 500 MG/100ML
500 INJECTION, SOLUTION INTRAVENOUS EVERY 12 HOURS
Status: DISCONTINUED | OUTPATIENT
Start: 2024-05-30 | End: 2024-05-31 | Stop reason: HOSPADM

## 2024-05-30 RX ORDER — SODIUM CHLORIDE, SODIUM LACTATE, POTASSIUM CHLORIDE, CALCIUM CHLORIDE 600; 310; 30; 20 MG/100ML; MG/100ML; MG/100ML; MG/100ML
INJECTION, SOLUTION INTRAVENOUS CONTINUOUS
Status: DISCONTINUED | OUTPATIENT
Start: 2024-05-30 | End: 2024-05-31 | Stop reason: HOSPADM

## 2024-05-30 RX ORDER — POLYETHYLENE GLYCOL 3350 17 G/17G
17 POWDER, FOR SOLUTION ORAL DAILY PRN
Status: DISCONTINUED | OUTPATIENT
Start: 2024-05-30 | End: 2024-05-31 | Stop reason: HOSPADM

## 2024-05-30 RX ORDER — MAGNESIUM SULFATE IN WATER 40 MG/ML
2000 INJECTION, SOLUTION INTRAVENOUS PRN
Status: DISCONTINUED | OUTPATIENT
Start: 2024-05-30 | End: 2024-05-31 | Stop reason: HOSPADM

## 2024-05-30 RX ORDER — POTASSIUM CHLORIDE 20 MEQ/1
40 TABLET, EXTENDED RELEASE ORAL PRN
Status: DISCONTINUED | OUTPATIENT
Start: 2024-05-30 | End: 2024-05-31 | Stop reason: HOSPADM

## 2024-05-30 RX ORDER — ONDANSETRON 2 MG/ML
4 INJECTION INTRAMUSCULAR; INTRAVENOUS EVERY 6 HOURS PRN
Status: DISCONTINUED | OUTPATIENT
Start: 2024-05-30 | End: 2024-05-31 | Stop reason: HOSPADM

## 2024-05-30 RX ORDER — ONDANSETRON 4 MG/1
4 TABLET, ORALLY DISINTEGRATING ORAL EVERY 8 HOURS PRN
Status: DISCONTINUED | OUTPATIENT
Start: 2024-05-30 | End: 2024-05-30 | Stop reason: SDUPTHER

## 2024-05-30 RX ORDER — ACETAMINOPHEN 325 MG/1
650 TABLET ORAL EVERY 6 HOURS PRN
Status: DISCONTINUED | OUTPATIENT
Start: 2024-05-30 | End: 2024-05-31 | Stop reason: HOSPADM

## 2024-05-30 RX ORDER — ACETAMINOPHEN 325 MG/1
650 TABLET ORAL EVERY 6 HOURS PRN
Status: DISCONTINUED | OUTPATIENT
Start: 2024-05-30 | End: 2024-05-30 | Stop reason: SDUPTHER

## 2024-05-30 RX ORDER — SODIUM CHLORIDE 0.9 % (FLUSH) 0.9 %
5-40 SYRINGE (ML) INJECTION EVERY 12 HOURS SCHEDULED
Status: DISCONTINUED | OUTPATIENT
Start: 2024-05-30 | End: 2024-05-31 | Stop reason: HOSPADM

## 2024-05-30 RX ORDER — ONDANSETRON 2 MG/ML
4 INJECTION INTRAMUSCULAR; INTRAVENOUS EVERY 6 HOURS PRN
Status: DISCONTINUED | OUTPATIENT
Start: 2024-05-30 | End: 2024-05-30 | Stop reason: SDUPTHER

## 2024-05-30 RX ORDER — ACETAMINOPHEN 650 MG/1
650 SUPPOSITORY RECTAL EVERY 6 HOURS PRN
Status: DISCONTINUED | OUTPATIENT
Start: 2024-05-30 | End: 2024-05-31 | Stop reason: HOSPADM

## 2024-05-30 RX ORDER — SODIUM CHLORIDE 9 MG/ML
INJECTION, SOLUTION INTRAVENOUS PRN
Status: DISCONTINUED | OUTPATIENT
Start: 2024-05-30 | End: 2024-05-31 | Stop reason: HOSPADM

## 2024-05-30 RX ADMIN — ACETAMINOPHEN 650 MG: 325 TABLET, FILM COATED ORAL at 14:13

## 2024-05-30 RX ADMIN — ONDANSETRON 4 MG: 4 TABLET, ORALLY DISINTEGRATING ORAL at 06:53

## 2024-05-30 RX ADMIN — PIPERACILLIN AND TAZOBACTAM 3375 MG: 3; .375 INJECTION, POWDER, LYOPHILIZED, FOR SOLUTION INTRAVENOUS at 14:10

## 2024-05-30 RX ADMIN — OXYCODONE HYDROCHLORIDE 5 MG: 5 TABLET ORAL at 21:04

## 2024-05-30 RX ADMIN — SODIUM CHLORIDE, PRESERVATIVE FREE 10 ML: 5 INJECTION INTRAVENOUS at 21:06

## 2024-05-30 RX ADMIN — PIPERACILLIN AND TAZOBACTAM 4500 MG: 4; .5 INJECTION, POWDER, LYOPHILIZED, FOR SOLUTION INTRAVENOUS at 00:19

## 2024-05-30 RX ADMIN — SODIUM CHLORIDE, POTASSIUM CHLORIDE, SODIUM LACTATE AND CALCIUM CHLORIDE: 600; 310; 30; 20 INJECTION, SOLUTION INTRAVENOUS at 21:10

## 2024-05-30 RX ADMIN — ENOXAPARIN SODIUM 40 MG: 100 INJECTION SUBCUTANEOUS at 10:04

## 2024-05-30 RX ADMIN — PIPERACILLIN AND TAZOBACTAM 3375 MG: 3; .375 INJECTION, POWDER, LYOPHILIZED, FOR SOLUTION INTRAVENOUS at 06:12

## 2024-05-30 RX ADMIN — SODIUM CHLORIDE, POTASSIUM CHLORIDE, SODIUM LACTATE AND CALCIUM CHLORIDE: 600; 310; 30; 20 INJECTION, SOLUTION INTRAVENOUS at 01:04

## 2024-05-30 RX ADMIN — METRONIDAZOLE 500 MG: 500 INJECTION, SOLUTION INTRAVENOUS at 15:27

## 2024-05-30 RX ADMIN — PIPERACILLIN AND TAZOBACTAM 3375 MG: 3; .375 INJECTION, POWDER, LYOPHILIZED, FOR SOLUTION INTRAVENOUS at 22:12

## 2024-05-30 ASSESSMENT — PAIN SCALES - GENERAL
PAINLEVEL_OUTOF10: 0
PAINLEVEL_OUTOF10: 6
PAINLEVEL_OUTOF10: 0
PAINLEVEL_OUTOF10: 0

## 2024-05-30 ASSESSMENT — PAIN DESCRIPTION - DESCRIPTORS: DESCRIPTORS: CRAMPING;DISCOMFORT;ACHING

## 2024-05-30 ASSESSMENT — PAIN DESCRIPTION - ORIENTATION: ORIENTATION: MID

## 2024-05-30 ASSESSMENT — PAIN DESCRIPTION - LOCATION: LOCATION: ABDOMEN;HEAD

## 2024-05-30 NOTE — PLAN OF CARE
Problem: Pain  Goal: Verbalizes/displays adequate comfort level or baseline comfort level  Outcome: Progressing  Flowsheets (Taken 5/30/2024 2828)  Verbalizes/displays adequate comfort level or baseline comfort level:   Encourage patient to monitor pain and request assistance   Assess pain using appropriate pain scale     Problem: Discharge Planning  Goal: Discharge to home or other facility with appropriate resources  Outcome: Progressing

## 2024-05-30 NOTE — PROGRESS NOTES
OBG/GYN Generic Progress Note    Patient who had LAVH 9d ago. Admitted with pain, diarrhea, decreased appetite, nausea. Pt sts her appetite has been decreased ever since the sx, but she had +flatus and NL/soft BMs. Then 48-72hrs ago developed nausea and severe diarrhea. The abd cramping with the diarrhea was very painful and caused her to break out into a sweat.   Today she reports she has been able to eat some grits w/o cramping or passage of stool.   She also c/o a painful hemorrhoid.     Vitals:  Blood pressure (!) 101/57, pulse 70, temperature 98 °F (36.7 °C), temperature source Oral, resp. rate 16, height 1.575 m (5' 2\"), weight 65.8 kg (145 lb), last menstrual period 2024, SpO2 96 %.  Temp (24hrs), Av.3 °F (36.8 °C), Min:98 °F (36.7 °C), Max:98.5 °F (36.9 °C)        Exam:  Patient without distress.               Abdomen soft,  nontender.                 Incisions dry and clean without erythema. Mild pink changes around the inc's- but she says this is less red than prior. And the areas itched before.           Labs:   Recent Results (from the past 24 hour(s))   CBC with Auto Differential    Collection Time: 24  7:55 PM   Result Value Ref Range    WBC 7.7 4.3 - 11.1 K/uL    RBC 4.54 4.05 - 5.2 M/uL    Hemoglobin 14.3 11.7 - 15.4 g/dL    Hematocrit 42.7 35.8 - 46.3 %    MCV 94.1 82.0 - 102.0 FL    MCH 31.5 26.1 - 32.9 PG    MCHC 33.5 31.4 - 35.0 g/dL    RDW 12.2 11.9 - 14.6 %    Platelets 299 150 - 450 K/uL    MPV 9.2 (L) 9.4 - 12.3 FL    nRBC 0.00 0.0 - 0.2 K/uL    Differential Type AUTOMATED      Neutrophils % 59 43 - 78 %    Lymphocytes % 26 13 - 44 %    Monocytes % 12 4.0 - 12.0 %    Eosinophils % 3 0.5 - 7.8 %    Basophils % 1 0.0 - 2.0 %    Immature Granulocytes % 0 0.0 - 5.0 %    Neutrophils Absolute 4.5 1.7 - 8.2 K/UL    Lymphocytes Absolute 2.0 0.5 - 4.6 K/UL    Monocytes Absolute 0.9 0.1 - 1.3 K/UL    Eosinophils Absolute 0.2 0.0 - 0.8 K/UL    Basophils Absolute 0.1 0.0 - 0.2 K/UL

## 2024-05-30 NOTE — ED NOTES
TRANSFER - OUT REPORT:    Verbal report given to Gwendolyn GONSALEZ  on Marco Antonio Ttae  being transferred to [facility] for routine progression of patient care       Report consisted of patient's Situation, Background, Assessment and   Recommendations(SBAR).     Information from the following report(s) Nurse Handoff Report was reviewed with the receiving nurse.    Roxanne Fall Assessment:    Presents to emergency department  because of falls (Syncope, seizure, or loss of consciousness): No  Age > 70: No  Altered Mental Status, Intoxication with alcohol or substance confusion (Disorientation, impaired judgment, poor safety awaremess, or inability to follow instructions): No  Impaired Mobility: Ambulates or transfers with assistive devices or assistance; Unable to ambulate or transer.: No  Nursing Judgement: No          Lines:   Peripheral IV 05/29/24 Right Antecubital (Active)   Site Assessment Clean, dry & intact 05/29/24 1959   Line Status Blood return noted;Flushed;Normal saline locked 05/29/24 1959       Peripheral IV 05/29/24 Left Antecubital (Active)        Opportunity for questions and clarification was provided.      Patient transported with:  Tech

## 2024-05-30 NOTE — CARE COORDINATION
Met with Ms. Tate and her father, Matt, at bedside for initial CM assessment. Patient is alert and oriented x4. Demographics verified. Patient confirms PCP listed on file but could not tell CM the last time she saw them-states it was approximately 20 years ago. She was agreeable for a referral to be sent for establishment of new PCP within the [facility].    Ms. Tate lives with her children in a two story home. She was independent with mobility and ADLs at most recent baseline and used no DME or services. She works full-time and remains an active  in the community. She plans to return home with her children at discharge and has a great family support system. Ms. Tate anticipates no needs from CM at this time. CM will continue to follow and assist with any needs that may arise during her stay.       05/30/24 1522   Service Assessment   Patient Orientation Alert and Oriented;Person;Place;Situation   Cognition Alert   History Provided By Patient   Primary Caregiver Self   Accompanied By/Relationship Father-Matt   Support Systems Parent;Children;Family Members;Friends/Neighbors   Patient's Healthcare Decision Maker is: Legal Next of Kin   PCP Verified by CM Yes  (Confirms PCP in system but hasnt seen in years. Agreeable for referral to new PCP)   Last Visit to PCP   (over 2 years)   Prior Functional Level Independent in ADLs/IADLs   Current Functional Level Independent in ADLs/IADLs   Can patient return to prior living arrangement Yes   Ability to make needs known: Good   Family able to assist with home care needs: Yes   Would you like for me to discuss the discharge plan with any other family members/significant others, and if so, who? Yes  (Father-Matt)   Financial Resources None   Community Resources None   CM/SW Referral Other (see comment);No PCP  (standard assessment)   Social/Functional History   Lives With Son;Daughter   Type of Home House   Home Layout Two level   Home Access Stairs to enter with

## 2024-05-30 NOTE — CONSULTS
Concerning the recent ct and possible abscess in the pelvis--the collection is too small and also not amenable to percutaneous drainage.  It will likely resolve with conservative measures.  I would not recommend IR guided drainage

## 2024-05-30 NOTE — H&P
GYN H&P    HPI: Marco Antonio aTte is a 39 y.o.  Body mass index is 26.52 kg/m². 8 days LAVH, Left LS and cystoscopy who presents for pelvic pain. Per op note uncomplicated procedure.  Reports first started on  with pelvic pain and a decreased appetite that has progressively gotten worse. Reports also symptoms of nausea and diarrhea. She also reports vaginal spotting that recently started.     In the ED, afebrile, no WBC, but UA with moderate blood and moderate leukocyte estrace and CT ab/pelvis that showed  3 x 2cm fluid collection in the pelvis likely due to an abscess as well as wall thickening of the colon. Findings discussed with patient with outpatient vs inpatient management. She prefers inpatient.        Past Medical History:   Diagnosis Date    Anemia in pregnancy, third trimester     Anemia in pregnancy, third trimester 3/12/2018    Breast disorder     pt has 3 breast lumps on right side - one is tagged - PCP manages    Cholelithiasis with cholecystitis 2013    Diabetes (HCC)     Gestational Only    Elevated prolactin level 4/3/2015    Gallbladder attack     Gestational diabetes     History of bladder infections     Hx of ectopic pregnancy     Infertility, female     Oligomenorrhea     Other ill-defined conditions(799.89)     hx of a ectopic pregnancy 09    Ovarian cyst     PCOS (polycystic ovarian syndrome)     Polycystic disease, ovaries     Previous recurrent miscarriages affecting pregnancy, antepartum       Past Surgical History:   Procedure Laterality Date    BREAST LUMPECTOMY Right     x3 benign     DELIVERY ONLY       &     CHOLECYSTECTOMY      GYN      Right fallopian tube removed with ectopic pregnancy    HYSTERECTOMY (CERVIX STATUS UNKNOWN) Bilateral 2024    ERAS LAPAROSCOPIC ASSISTED VAGINAL HYSTERECTOMY WITH LEFT SALPINGECTOMY AND CYSTOSCOPY; PERINEAL BIOPSY, PERIRECTAL BIOPSY performed by Cindy Foreman MD at [facility] OR     (4/4/2024)    Housing Stability Vital Sign     Unable to Pay for Housing in the Last Year: Not on file     Number of Places Lived in the Last Year: Not on file     Unstable Housing in the Last Year: No     Family History   Problem Relation Age of Onset    Lung Disease Maternal Uncle     Colon Cancer Maternal Uncle     Deep Vein Thrombosis Paternal Grandfather     Cancer Paternal Grandfather         melanoma    Hypertension Maternal Grandmother     Cancer Father         Basal cell ca at nose and prostate cancer    Heart Disease Maternal Grandmother     Cancer Mother         uterine    Hypertension Mother     Elevated Lipids Father     Diabetes Maternal Grandmother      No current facility-administered medications on file prior to encounter.     Current Outpatient Medications on File Prior to Encounter   Medication Sig Dispense Refill    acetaminophen (TYLENOL) 500 MG tablet Take 2 tablets by mouth every 6 hours as needed for Pain      ibuprofen (ADVIL;MOTRIN) 800 MG tablet Take 1 tablet by mouth every 8 hours as needed for Pain 60 tablet 1    ondansetron (ZOFRAN) 4 MG tablet Take 1 tablet by mouth daily as needed for Nausea or Vomiting 30 tablet 0    lidocaine-prilocaine (EMLA) 2.5-2.5 % cream Apply topically as needed. (Patient not taking: Reported on 4/24/2024) 5 g 0     Allergies   Allergen Reactions    Latex Hives     NO Latex Catheter    Adhesive Tape Rash       ROS:   Gen: Denies fevers, chills, sweats, anorexia.   CV: Denies chest pains, palpitations, syncope.   Resp: Denies cough, dyspnea, excessive sputum.   GI: Denies nausea, vomiting, diarrhea, constipation.   : Denies incontinence, dysuria, hematuria.   Psych: Denies depression, anxiety  Heme: Denies abnormal bruising, bleeding, enlarged lymph nodes  Neuro:  Denies dizziness, focal weakness, paresthesias, seizures, syncope  Endo: Denies hot/cold intolerance, dry skin  Skin:  Denies rash, suspicious lesions, dryness.     Physical Exam:  Vitals:

## 2024-05-31 VITALS
TEMPERATURE: 98.9 F | SYSTOLIC BLOOD PRESSURE: 94 MMHG | RESPIRATION RATE: 17 BRPM | DIASTOLIC BLOOD PRESSURE: 56 MMHG | BODY MASS INDEX: 26.37 KG/M2 | HEART RATE: 70 BPM | WEIGHT: 143.3 LBS | HEIGHT: 62 IN | OXYGEN SATURATION: 97 %

## 2024-05-31 PROBLEM — R10.84 GENERALIZED ABDOMINAL PAIN: Status: ACTIVE | Noted: 2024-05-31

## 2024-05-31 LAB
ALBUMIN SERPL-MCNC: 2.6 G/DL (ref 3.5–5)
ALBUMIN/GLOB SERPL: 1 (ref 1–1.9)
ALP SERPL-CCNC: 89 U/L (ref 35–104)
ALT SERPL-CCNC: 82 U/L (ref 12–65)
ANION GAP SERPL CALC-SCNC: 8 MMOL/L (ref 9–18)
AST SERPL-CCNC: 28 U/L (ref 15–37)
BASOPHILS # BLD: 0 K/UL (ref 0–0.2)
BASOPHILS NFR BLD: 1 % (ref 0–2)
BILIRUB SERPL-MCNC: 0.3 MG/DL (ref 0–1.2)
BUN SERPL-MCNC: 8 MG/DL (ref 6–23)
CALCIUM SERPL-MCNC: 8.6 MG/DL (ref 8.8–10.2)
CHLORIDE SERPL-SCNC: 109 MMOL/L (ref 98–107)
CO2 SERPL-SCNC: 25 MMOL/L (ref 20–28)
CREAT SERPL-MCNC: 0.78 MG/DL (ref 0.6–1.1)
DIFFERENTIAL METHOD BLD: ABNORMAL
EOSINOPHIL # BLD: 0.2 K/UL (ref 0–0.8)
EOSINOPHIL NFR BLD: 3 % (ref 0.5–7.8)
ERYTHROCYTE [DISTWIDTH] IN BLOOD BY AUTOMATED COUNT: 12.3 % (ref 11.9–14.6)
GLOBULIN SER CALC-MCNC: 2.7 G/DL (ref 2.3–3.5)
GLUCOSE SERPL-MCNC: 83 MG/DL (ref 70–99)
HAV IGM SER QL: NONREACTIVE
HBV CORE IGM SER QL: NONREACTIVE
HBV SURFACE AG SER QL: NONREACTIVE
HCT VFR BLD AUTO: 36.4 % (ref 35.8–46.3)
HCV AB SER QL: NONREACTIVE
HGB BLD-MCNC: 12 G/DL (ref 11.7–15.4)
IMM GRANULOCYTES # BLD AUTO: 0 K/UL (ref 0–0.5)
IMM GRANULOCYTES NFR BLD AUTO: 0 % (ref 0–5)
LYMPHOCYTES # BLD: 2 K/UL (ref 0.5–4.6)
LYMPHOCYTES NFR BLD: 28 % (ref 13–44)
MCH RBC QN AUTO: 31.3 PG (ref 26.1–32.9)
MCHC RBC AUTO-ENTMCNC: 33 G/DL (ref 31.4–35)
MCV RBC AUTO: 94.8 FL (ref 82–102)
MONOCYTES # BLD: 0.8 K/UL (ref 0.1–1.3)
MONOCYTES NFR BLD: 12 % (ref 4–12)
NEUTS SEG # BLD: 4 K/UL (ref 1.7–8.2)
NEUTS SEG NFR BLD: 57 % (ref 43–78)
NRBC # BLD: 0 K/UL (ref 0–0.2)
PLATELET # BLD AUTO: 266 K/UL (ref 150–450)
PMV BLD AUTO: 9.2 FL (ref 9.4–12.3)
POTASSIUM SERPL-SCNC: 4.1 MMOL/L (ref 3.5–5.1)
PROT SERPL-MCNC: 5.3 G/DL (ref 6.3–8.2)
RBC # BLD AUTO: 3.84 M/UL (ref 4.05–5.2)
SODIUM SERPL-SCNC: 142 MMOL/L (ref 136–145)
WBC # BLD AUTO: 7.1 K/UL (ref 4.3–11.1)

## 2024-05-31 PROCEDURE — 80074 ACUTE HEPATITIS PANEL: CPT

## 2024-05-31 PROCEDURE — 96372 THER/PROPH/DIAG INJ SC/IM: CPT

## 2024-05-31 PROCEDURE — 6360000002 HC RX W HCPCS: Performed by: OBSTETRICS & GYNECOLOGY

## 2024-05-31 PROCEDURE — 99233 SBSQ HOSP IP/OBS HIGH 50: CPT | Performed by: OBSTETRICS & GYNECOLOGY

## 2024-05-31 PROCEDURE — 96366 THER/PROPH/DIAG IV INF ADDON: CPT

## 2024-05-31 PROCEDURE — 6370000000 HC RX 637 (ALT 250 FOR IP): Performed by: OBSTETRICS & GYNECOLOGY

## 2024-05-31 PROCEDURE — 36415 COLL VENOUS BLD VENIPUNCTURE: CPT

## 2024-05-31 PROCEDURE — 85025 COMPLETE CBC W/AUTO DIFF WBC: CPT

## 2024-05-31 PROCEDURE — 1100000000 HC RM PRIVATE

## 2024-05-31 PROCEDURE — 80053 COMPREHEN METABOLIC PANEL: CPT

## 2024-05-31 PROCEDURE — 2580000003 HC RX 258: Performed by: OBSTETRICS & GYNECOLOGY

## 2024-05-31 RX ORDER — METRONIDAZOLE 500 MG/1
500 TABLET ORAL EVERY 12 HOURS
Qty: 28 TABLET | Refills: 0 | Status: SHIPPED | OUTPATIENT
Start: 2024-05-31 | End: 2024-06-14

## 2024-05-31 RX ORDER — SULFAMETHOXAZOLE AND TRIMETHOPRIM 800; 160 MG/1; MG/1
1 TABLET ORAL EVERY 12 HOURS
Qty: 28 TABLET | Refills: 0 | Status: SHIPPED | OUTPATIENT
Start: 2024-05-31 | End: 2024-06-14

## 2024-05-31 RX ADMIN — ENOXAPARIN SODIUM 40 MG: 100 INJECTION SUBCUTANEOUS at 09:49

## 2024-05-31 RX ADMIN — METRONIDAZOLE 500 MG: 500 INJECTION, SOLUTION INTRAVENOUS at 15:58

## 2024-05-31 RX ADMIN — SODIUM CHLORIDE, PRESERVATIVE FREE 10 ML: 5 INJECTION INTRAVENOUS at 09:50

## 2024-05-31 RX ADMIN — PIPERACILLIN AND TAZOBACTAM 3375 MG: 3; .375 INJECTION, POWDER, LYOPHILIZED, FOR SOLUTION INTRAVENOUS at 05:59

## 2024-05-31 RX ADMIN — METRONIDAZOLE 500 MG: 500 INJECTION, SOLUTION INTRAVENOUS at 03:54

## 2024-05-31 RX ADMIN — SODIUM CHLORIDE, POTASSIUM CHLORIDE, SODIUM LACTATE AND CALCIUM CHLORIDE: 600; 310; 30; 20 INJECTION, SOLUTION INTRAVENOUS at 05:55

## 2024-05-31 RX ADMIN — SODIUM CHLORIDE, POTASSIUM CHLORIDE, SODIUM LACTATE AND CALCIUM CHLORIDE: 600; 310; 30; 20 INJECTION, SOLUTION INTRAVENOUS at 14:12

## 2024-05-31 RX ADMIN — ACETAMINOPHEN 650 MG: 325 TABLET, FILM COATED ORAL at 15:54

## 2024-05-31 RX ADMIN — OXYCODONE HYDROCHLORIDE 5 MG: 5 TABLET ORAL at 09:50

## 2024-05-31 RX ADMIN — PIPERACILLIN AND TAZOBACTAM 3375 MG: 3; .375 INJECTION, POWDER, LYOPHILIZED, FOR SOLUTION INTRAVENOUS at 14:13

## 2024-05-31 ASSESSMENT — PAIN SCALES - GENERAL
PAINLEVEL_OUTOF10: 3
PAINLEVEL_OUTOF10: 2
PAINLEVEL_OUTOF10: 6

## 2024-05-31 ASSESSMENT — PAIN DESCRIPTION - LOCATION: LOCATION: ABDOMEN

## 2024-05-31 NOTE — PROGRESS NOTES
.Gynecology Progress Note      HD#2  POD#11    Marco Antonio Tate is a 38yo  POD#11 s/p LAVH, L salpingectomy, Cystoscopy, perineal/perirectal biopsies due to pelvic pain, HMB, dysmenorrhea w/ partner Dr Foreman on 24.  Cystoscopy was wnl following the LAVH.    Path:  ADALID III of cervix, perineal/perirectal bxs c/w focal moderate dysplasia.     Pt admitted for pelvic pain, diarrhea, decreased appetite, nausea but she had +flatus and NL/soft BMs. Then 48-72hrs prior to admission developed nausea and severe diarrhea causing significant pain.  No fevers.     CT  IMPRESSION:  1.  There is a 19 x 20 x 30 mm fluid collection within the pelvislikely due to an abscess.  2.  There is wall thickening of the sigmoid colon and rectum which  could be due to infectious versus inflammatory etiologies.    *no mention of Joslyn/surgicel powder in Op note      Has been on IV Zosyn and Flagyl while inpt.      S/p IR consult (24) -- \"collection is too small and also not amenable to percutaneous drainage. It will likely resolve with conservative measures. I would not recommend IR guided drainage\"       No Leukocytosis   UA with moderate blood and moderate leukocyte estrace in the ER; urine culture pending ___  Blood cultures pending __ (neg thus far)   Cdiff neg  Stool cx pending__     LFTs noted to incidentally be elevated due to uncertain etiology--> improved this morning.  Hep panel neg.           Today:    Patient doing well and without significant complaints.  Pain controlled but still has bouts of pain when she has diarrhea.  The diarrhea has slowed compared to that on admission.  No bloody stools.    Voiding without difficulty. Tolerating regular diet but does not have much of an appetite in general since her procedure.      Remains afebrile.   Pt requesting DC today.        Vitals:    /67   Pulse 67   Temp 98.3 °F (36.8 °C) (Oral)   Resp 17   Ht 1.575 m (5' 2\")   Wt 65 kg (143 lb 4.8 oz)   LMP  Glom Filt Rate >90 >60 ml/min/1.73m2    Calcium 9.1 8.8 - 10.2 MG/DL    Total Bilirubin 0.3 0.0 - 1.2 MG/DL     (H) 12 - 65 U/L    AST 40 (H) 15 - 37 U/L    Alk Phosphatase 114 (H) 35 - 104 U/L    Total Protein 6.6 6.3 - 8.2 g/dL    Albumin 3.4 (L) 3.5 - 5.0 g/dL    Globulin 3.2 2.3 - 3.5 g/dL    Albumin/Globulin Ratio 1.1 1.0 - 1.9     Lipase    Collection Time: 05/29/24  7:55 PM   Result Value Ref Range    Lipase 25 13 - 60 U/L   Urinalysis “IF” dysuria, frequency, or urgency.    Collection Time: 05/29/24  7:56 PM   Result Value Ref Range    Color, UA YELLOW/STRAW      Appearance CLOUDY      Specific Gravity, UA 1.018 1.001 - 1.023      pH, Urine 5.5 5.0 - 9.0      Protein, UA Negative NEG mg/dL    Glucose, Ur Negative mg/dL    Ketones, Urine TRACE (A) NEG mg/dL    Bilirubin, Urine Negative NEG      Blood, Urine MODERATE (A) NEG      Urobilinogen, Urine 0.2 0.2 - 1.0 EU/dL    Nitrite, Urine Negative NEG      Leukocyte Esterase, Urine MODERATE (A) NEG      WBC, UA 10-20 0 /hpf    RBC, UA 5-10 0 /hpf    Epithelial Cells, UA 10-20 0 /hpf    BACTERIA, URINE 3+ (H) 0 /hpf    Mucus, UA 1+ (H) 0 /lpf    Other observations RESULTS VERIFIED MANUALLY     Pregnancy, Urine    Collection Time: 05/29/24  7:56 PM   Result Value Ref Range    Pregnancy, Urine Negative NEG     POCT Glucose    Collection Time: 05/29/24  8:51 PM   Result Value Ref Range    POC Glucose 85 65 - 100 mg/dL    Performed by: ROMEL    Blood Culture 1    Collection Time: 05/29/24 11:18 PM    Specimen: Blood   Result Value Ref Range    Special Requests LEFT  Antecubital        Culture NO GROWTH 2 DAYS     C. difficile toxin Molecular    Collection Time: 05/30/24  2:13 PM    Specimen: Stool   Result Value Ref Range    C. difficile toxin Molecular Negative NEG     CBC with Auto Differential    Collection Time: 05/31/24  4:18 AM   Result Value Ref Range    WBC 7.1 4.3 - 11.1 K/uL    RBC 3.84 (L) 4.05 - 5.2 M/uL    Hemoglobin 12.0 11.7 - 15.4 g/dL     Hematocrit 36.4 35.8 - 46.3 %    MCV 94.8 82.0 - 102.0 FL    MCH 31.3 26.1 - 32.9 PG    MCHC 33.0 31.4 - 35.0 g/dL    RDW 12.3 11.9 - 14.6 %    Platelets 266 150 - 450 K/uL    MPV 9.2 (L) 9.4 - 12.3 FL    nRBC 0.00 0.0 - 0.2 K/uL    Differential Type AUTOMATED      Neutrophils % 57 43 - 78 %    Lymphocytes % 28 13 - 44 %    Monocytes % 12 4.0 - 12.0 %    Eosinophils % 3 0.5 - 7.8 %    Basophils % 1 0.0 - 2.0 %    Immature Granulocytes % 0 0.0 - 5.0 %    Neutrophils Absolute 4.0 1.7 - 8.2 K/UL    Lymphocytes Absolute 2.0 0.5 - 4.6 K/UL    Monocytes Absolute 0.8 0.1 - 1.3 K/UL    Eosinophils Absolute 0.2 0.0 - 0.8 K/UL    Basophils Absolute 0.0 0.0 - 0.2 K/UL    Immature Granulocytes Absolute 0.0 0.0 - 0.5 K/UL   Comprehensive Metabolic Panel    Collection Time: 24  4:18 AM   Result Value Ref Range    Sodium 142 136 - 145 mmol/L    Potassium 4.1 3.5 - 5.1 mmol/L    Chloride 109 (H) 98 - 107 mmol/L    CO2 25 20 - 28 mmol/L    Anion Gap 8 (L) 9 - 18 mmol/L    Glucose 83 70 - 99 mg/dL    BUN 8 6 - 23 MG/DL    Creatinine 0.78 0.60 - 1.10 MG/DL    Est, Glom Filt Rate >90 >60 ml/min/1.73m2    Calcium 8.6 (L) 8.8 - 10.2 MG/DL    Total Bilirubin 0.3 0.0 - 1.2 MG/DL    ALT 82 (H) 12 - 65 U/L    AST 28 15 - 37 U/L    Alk Phosphatase 89 35 - 104 U/L    Total Protein 5.3 (L) 6.3 - 8.2 g/dL    Albumin 2.6 (L) 3.5 - 5.0 g/dL    Globulin 2.7 2.3 - 3.5 g/dL    Albumin/Globulin Ratio 1.0 1.0 - 1.9     Hepatitis Panel, Acute    Collection Time: 24  4:18 AM   Result Value Ref Range    Hep A IgM NONREACTIVE NR      Hep B Core Ab, IgM NONREACTIVE NR      Hepatitis B Surface Ag NONREACTIVE NR      Hepatitis C Ab NONREACTIVE NR           Assessment and Plan:     40yo  POD#11 s/p LAVH, HD#2  nausea, diarrhea, pelvic pain:    -s/p Flagyl and Zosyn while inpt  -remains afebrile w/ no leukocystosis  -pt desires DC home ---will DC w/ broad spectrum PO regimen Bactrim DS Q12 and Flagyl 500mg Q12, both x 14 days  -stay

## 2024-05-31 NOTE — PROGRESS NOTES
Critical Care Interdisciplinary Rounds with staff.  Patient also received a visit and prayer from Spiritual Care Volunteer.     Rosey Gómez M.Div, [facility] / / Bereavement Coordinator  Spiritual Care Department   c: [phone]/ [phone] / [email or web address]  [email or web address]

## 2024-05-31 NOTE — CARE COORDINATION
Pt chart reviewed and discussed in AM IDR for continued stay. Pt admitted inpatient today with pelvic abscess. Per MD note, potentially medically stable for discharge today.     [facility]     No additional needs anticipated.     CM team will continue to follow for potential discharge needs that may arise.

## 2024-05-31 NOTE — DISCHARGE SUMMARY
Discharge Summary:     Date of Admission:  2024  7:34 PM  Date of Discharge:   2024      Patient is a 39 y.o.  who is s/p uncomplicated LAVH, L salpingectomy, Cystoscopy, perineal/perirectal biopsies due to pelvic pain, HMB, dysmenorrhea w/ partner Dr Foreman on 24.      Path:  ADALID III of cervix, perineal/perirectal bxs c/w focal moderate dysplasia.         Pt admitted for pelvic pain, diarrhea, decreased appetite, nausea but she had +flatus and NL/soft BMs. Then 48-72hrs prior to admission developed nausea and severe diarrhea causing significant pain.  No fevers.      CT  IMPRESSION:  1.  There is a 19 x 20 x 30 mm fluid collection within the pelvislikely due to an abscess.  2.  There is wall thickening of the sigmoid colon and rectum which  could be due to infectious versus inflammatory etiologies.     *no mention of Joslyn/surgicel powder in Op note        S/p IR consult (24) -- \"collection is too small and also not amenable to percutaneous drainage. It will likely resolve with conservative measures. I would not recommend IR guided drainage\"     s/p IV Flagyl and Zosyn while inpt         No Leukocytosis   UA with moderate blood and moderate leukocyte estrace in the ER; urine culture pending (neg at time of DC)  Blood cultures pending (neg at time of DC)  Cdiff neg  Stool cx pending (neg at time of DC)     LFTs noted to incidentally be elevated due to uncertain etiology-->resolved at time of DC.  Hep panel neg.         On day of DC pt remains afebrile w/ no leukocystosis and symptoms improved.  Desires DC home.    Will DC w/ broad spectrum PO regimen Bactrim DS Q12 and Flagyl 500mg Q12, both x 14 days       Pt to follow up early next week for postop check/hospital FU w/ Dr Foreman at [facility].          Discharge Meds:       Medication List        START taking these medications      metroNIDAZOLE 500 MG tablet  Commonly known as: FLAGYL  Take 1 tablet by mouth in the

## 2024-06-01 LAB
BACTERIA SPEC CULT: NORMAL
BACTERIA SPEC CULT: NORMAL
SERVICE CMNT-IMP: NORMAL

## 2024-06-02 LAB
BACTERIA SPEC CULT: ABNORMAL
BACTERIA SPEC CULT: NORMAL
SERVICE CMNT-IMP: ABNORMAL
SERVICE CMNT-IMP: NORMAL

## 2024-06-03 LAB
BACTERIA SPEC CULT: NORMAL
SERVICE CMNT-IMP: NORMAL

## 2024-06-03 RX ORDER — FLUCONAZOLE 150 MG/1
150 TABLET ORAL
Qty: 3 TABLET | Refills: 0 | Status: SHIPPED | OUTPATIENT
Start: 2024-06-03 | End: 2024-06-10

## 2024-06-03 ASSESSMENT — ENCOUNTER SYMPTOMS
VOMITING: 1
NAUSEA: 1

## 2024-06-04 ENCOUNTER — OFFICE VISIT (OUTPATIENT)
Dept: OBGYN CLINIC | Age: 39
End: 2024-06-04

## 2024-06-04 VITALS
BODY MASS INDEX: 25.95 KG/M2 | HEIGHT: 62 IN | DIASTOLIC BLOOD PRESSURE: 62 MMHG | WEIGHT: 141 LBS | SYSTOLIC BLOOD PRESSURE: 102 MMHG

## 2024-06-04 DIAGNOSIS — Z09 POSTOP CHECK: Primary | ICD-10-CM

## 2024-06-04 PROCEDURE — 99024 POSTOP FOLLOW-UP VISIT: CPT | Performed by: OBSTETRICS & GYNECOLOGY

## 2024-06-04 NOTE — PROGRESS NOTES
Chief Complaint   Patient presents with    Post-Op Check     2 weeks (5/20/24) s/p LAVH, L salpingectomy, Cystoscopy, perineal/perirectal biopsies     Patient presents today for a post-op visit. Denies complaints - was hospitalized last week with concern for abscess due to pain, etc. Work up negative for abscess.    Surgery Performed: LAVH, Left salpingectomy, Cystoscopy, biopsy of perineal skin tags, biopsy of perirectal skin tags.  Surgery Date: 5/20/24  Pathology: Severe cervical dysplasia (with negative margins) / ADALID 3. Benign endometrium. Perineal biopsies and perirectal biopsies show \"squamous epithelium with moderate dysplasia.\"    Vitals:    06/04/24 1616   BP: 102/62         6/4/2024     4:16 PM 5/30/2024     2:42 PM 5/29/2024     6:18 PM 5/20/2024     8:20 AM 5/13/2024     1:31 PM 4/24/2024     1:14 PM 4/10/2024     2:38 PM   Weight Metrics   Weight 141 lb 143 lb 4.8 oz 145 lb 146 lb 12.8 oz 148 lb 135 lb 146 lb   BMI (Calculated) 25.8 kg/m2 26.3 kg/m2 26.6 kg/m2 26.9 kg/m2 27.1 kg/m2 24.7 kg/m2 26.8 kg/m2     Physical Exam  Vitals reviewed.   Constitutional:       General: She is not in acute distress.     Appearance: Normal appearance. She is well-developed. She is not ill-appearing, toxic-appearing or diaphoretic.   HENT:      Head: Normocephalic and atraumatic.   Eyes:      General: No scleral icterus.     Conjunctiva/sclera: Conjunctivae normal.      Pupils: Pupils are equal, round, and reactive to light.   Cardiovascular:      Rate and Rhythm: Normal rate and regular rhythm.      Heart sounds: Normal heart sounds. No murmur. No friction rub. No gallop.    Pulmonary:      Effort: Pulmonary effort is normal. No respiratory distress.      Breath sounds: Normal breath sounds. No stridor. No wheezing, rhonchi or rales.   Abdominal:      General: There is no distension. Incisions healing well. ATTP.  Musculoskeletal:         General: Normal range of motion.      Cervical back: Normal range of motion and

## 2024-06-12 NOTE — PROGRESS NOTES
Physician Progress Note      PATIENT:               MCKENNA BEARD  [facility] #:                  [id number]  :                       1985  ADMIT DATE:       2024 7:34 PM  DISCH DATE:        2024 7:30 PM  RESPONDING  PROVIDER #:        Swapna Lucia MD          QUERY TEXT:    Pt admitted with pelvic abscess and underwent LAVH, salpingectomy on . If   possible, please document in progress notes and discharge summary the   relationship if any between the pelvic abscess and LAVH, salpingectomy:    The medical record reflects the following:  Risk Factors: hemorrhoids, diarrhea, nausea and vomiting  Clinical Indicators: PN -Postop pain and GI changes with nausea and   diarrhea. Elevated LFTs- unclear etiology.  PN  Postop pain and GI changes with nausea and diarrhea. Elevated LFTs-   unclear etiology.  ED note Postoperative abscess of pelvis in female  DS  s/p uncomplicated LAVH, L salpingectomy, Cystoscopy,   perineal/perirectal biopsies due to pelvic pain, HMB, dysmenorrhea w/ partner   Dr Foreman on 24.  CT -There is a 19 x 20 x 30 mm fluid collection within the pelvis likely   due to an abscess,  WBC-7.7,7.1, temp-98.5 -BP-102/68, Pulse-72, RR-15,16,17  Treatment: IV Zosyn, Rocephin    Thank You,  IGLESIA Abreu, CDS  Options provided:  -- Pelvic abscess is due to LAVH, salpingectomy  -- Pelvic abscess is not due to LAVH, salpingectomy, but is due to other   incidental risk factor, Please specify other incidental risk factor.  -- Other - I will add my own diagnosis  -- Disagree - Not applicable / Not valid  -- Disagree - Clinically unable to determine / Unknown  -- Refer to Clinical Documentation Reviewer    PROVIDER RESPONSE TEXT:    nausea, diarrhea, pelvic pain of uncertain etiology given remained afebrile w/   no leukocystosis very unlikely to have been an abscess.  Likely was a GI virus unrelated to surgery        Query created by: Sadia Carranza on

## 2024-07-03 ENCOUNTER — TELEPHONE (OUTPATIENT)
Dept: OBGYN CLINIC | Age: 39
End: 2024-07-03

## 2024-07-03 DIAGNOSIS — N90.1 MODERATE DYSPLASIA OF VULVA: ICD-10-CM

## 2024-07-03 DIAGNOSIS — Z90.710 S/P LAPAROSCOPIC ASSISTED VAGINAL HYSTERECTOMY (LAVH): ICD-10-CM

## 2024-07-03 DIAGNOSIS — D06.9 SEVERE DYSPLASIA OF CERVIX (CIN III): Primary | ICD-10-CM

## 2024-07-03 NOTE — TELEPHONE ENCOUNTER
Called pt to discuss her request for referral to gyn oncology to discuss next steps. Order placed for referral to [facility] Gyn oncologist, however, pt requests Dr. Greene with [facility] due to having a friend's recommendation. Will place order as requested. Pt is s/p LAVH, LS, cystoscopy, and perineal biopsy as well as perirectal biopsy (for skin tags concerning for condyloma, however, her pathology simply reported as \"moderate dysplasia\" for her perineal biopsy. Pt had ADALID 1 on colposcopy prior to surgery (approx 1 month prior) and yet was found to have ADALID 3 on path from her hysterectomy. Pt is very motivated to pursue any preventive care she requires and would like to have any available testing that is possible given that she lost her mother to aggressive uterine/cervical cancer.

## 2024-07-08 ENCOUNTER — OFFICE VISIT (OUTPATIENT)
Dept: OBGYN CLINIC | Age: 39
End: 2024-07-08
Payer: COMMERCIAL

## 2024-07-08 VITALS
HEIGHT: 62 IN | WEIGHT: 137 LBS | SYSTOLIC BLOOD PRESSURE: 110 MMHG | DIASTOLIC BLOOD PRESSURE: 68 MMHG | BODY MASS INDEX: 25.21 KG/M2

## 2024-07-08 DIAGNOSIS — R35.0 URINARY FREQUENCY: ICD-10-CM

## 2024-07-08 DIAGNOSIS — R82.90 ABNORMAL URINE ODOR: ICD-10-CM

## 2024-07-08 DIAGNOSIS — Z09 POSTOP CHECK: Primary | ICD-10-CM

## 2024-07-08 LAB
BILIRUBIN, URINE, POC: NEGATIVE
BLOOD URINE, POC: NEGATIVE
GLUCOSE URINE, POC: NEGATIVE
KETONES, URINE, POC: NEGATIVE
LEUKOCYTE ESTERASE, URINE, POC: NEGATIVE
NITRITE, URINE, POC: NEGATIVE
PH, URINE, POC: 6 (ref 4.6–8)
PROTEIN,URINE, POC: NEGATIVE
SPECIFIC GRAVITY, URINE, POC: 1.01 (ref 1–1.03)
URINALYSIS CLARITY, POC: NORMAL
URINALYSIS COLOR, POC: NORMAL
UROBILINOGEN, POC: NORMAL

## 2024-07-08 PROCEDURE — 81003 URINALYSIS AUTO W/O SCOPE: CPT | Performed by: OBSTETRICS & GYNECOLOGY

## 2024-07-08 PROCEDURE — 99024 POSTOP FOLLOW-UP VISIT: CPT | Performed by: OBSTETRICS & GYNECOLOGY

## 2024-07-08 NOTE — PROGRESS NOTES
Chief Complaint   Patient presents with    Post-Op Check     7 weeks (5/20/24) s/p LAVH, L salpingectomy, Cystoscopy, perineal/perirectal biopsies       Patient presents today for a post-op visit.      Surgery Performed: LAVH, Left salpingectomy, Cystoscopy, biopsy of perineal skin tags, biopsy of perirectal skin tags.  Surgery Date: 5/20/24  Pathology: Severe cervical dysplasia (with negative margins) / ADALID 3. Benign endometrium. Perineal biopsies and perirectal biopsies show \"squamous epithelium with moderate dysplasia.\"    Vitals:    07/08/24 1129   BP: 110/68         7/8/2024    11:29 AM 6/4/2024     4:16 PM 5/30/2024     2:42 PM 5/29/2024     6:18 PM 5/20/2024     8:20 AM 5/13/2024     1:31 PM 4/24/2024     1:14 PM   Weight Metrics   Weight 137 lb 141 lb 143 lb 4.8 oz 145 lb 146 lb 12.8 oz 148 lb 135 lb   BMI (Calculated) 25.1 kg/m2 25.8 kg/m2 26.3 kg/m2 26.6 kg/m2 26.9 kg/m2 27.1 kg/m2 24.7 kg/m2     Physical Exam  Vitals reviewed.   Constitutional:       General: She is not in acute distress.     Appearance: Normal appearance. She is well-developed. She is not ill-appearing, toxic-appearing or diaphoretic.   HENT:      Head: Normocephalic and atraumatic.   Eyes:      General: No scleral icterus.     Conjunctiva/sclera: Conjunctivae normal.      Pupils: Pupils are equal, round, and reactive to light.   Cardiovascular:      Rate and Rhythm: Normal rate and regular rhythm.      Heart sounds: Normal heart sounds. No murmur. No friction rub. No gallop.    Pulmonary:      Effort: Pulmonary effort is normal. No respiratory distress.      Breath sounds: Normal breath sounds. No stridor. No wheezing, rhonchi or rales.   Abdominal:      General: There is no distension. Incisions healing well. NTTP.  Genitourinary:      External genitalia normal, no injury present. Notably, the skin of the perineum and perirectal area shows regrowth of the skin tags that were removed at the time of her surgery.      Vaginal vault

## 2024-07-11 LAB
BACTERIA SPEC CULT: ABNORMAL
BACTERIA SPEC CULT: ABNORMAL
SERVICE CMNT-IMP: ABNORMAL

## 2024-07-16 RX ORDER — NITROFURANTOIN 25; 75 MG/1; MG/1
100 CAPSULE ORAL 2 TIMES DAILY
Qty: 20 CAPSULE | Refills: 0 | Status: SHIPPED | OUTPATIENT
Start: 2024-07-16 | End: 2024-07-26

## 2024-07-30 RX ORDER — CEPHALEXIN 500 MG/1
500 CAPSULE ORAL 2 TIMES DAILY
Qty: 20 CAPSULE | Refills: 0 | Status: SHIPPED | OUTPATIENT
Start: 2024-07-30 | End: 2024-08-09

## 2024-09-10 ENCOUNTER — HOSPITAL ENCOUNTER (OUTPATIENT)
Dept: MAMMOGRAPHY | Age: 39
Discharge: HOME | End: 2024-09-13
Payer: COMMERCIAL

## 2024-09-10 DIAGNOSIS — Z01.419 WELL WOMAN EXAM WITH ROUTINE GYNECOLOGICAL EXAM: ICD-10-CM

## 2024-09-10 DIAGNOSIS — Z12.31 SCREENING MAMMOGRAM FOR BREAST CANCER: ICD-10-CM

## 2024-09-10 PROCEDURE — 77063 BREAST TOMOSYNTHESIS BI: CPT

## (undated) DEVICE — MEDI-VAC NON-CONDUCTIVE SUCTION TUBING: Brand: CARDINAL HEALTH

## (undated) DEVICE — KENDALL SCD EXPRESS SLEEVES, KNEE LENGTH, MEDIUM: Brand: KENDALL SCD

## (undated) DEVICE — AMD ANTIMICROBIAL GAUZE SPONGES,12 PLY USP TYPE VII, 0.2% POLYHEXAMETHYLENE BIGUANIDE HCI (PHMB): Brand: CURITY

## (undated) DEVICE — REM POLYHESIVE ADULT PATIENT RETURN ELECTRODE: Brand: VALLEYLAB

## (undated) DEVICE — PREMIUM WET SKIN PREP TRAY: Brand: MEDLINE INDUSTRIES, INC.

## (undated) DEVICE — SUTURE VCRL SZ 2-0 L36IN ABSRB VLT L36MM CT-1 1/2 CIR J345H

## (undated) DEVICE — GOWN,REINF,POLY,ECL,PP SLV,XL: Brand: MEDLINE

## (undated) DEVICE — SOLUTION IRRIG 1000ML H2O STRL BLT

## (undated) DEVICE — SEALER ENDOSCP L37CM NANO COAT BLNT TIP LAP DIV

## (undated) DEVICE — TUBING, SUCTION, 1/4" X 10', STRAIGHT: Brand: MEDLINE

## (undated) DEVICE — INTENDED FOR TISSUE SEPARATION, AND OTHER PROCEDURES THAT REQUIRE A SHARP SURGICAL BLADE TO PUNCTURE OR CUT.: Brand: BARD-PARKER ® STAINLESS STEEL BLADES

## (undated) DEVICE — LIQUIBAND RAPID ADHESIVE 36/CS 0.8ML: Brand: MEDLINE

## (undated) DEVICE — SHEET,DRAPE,53X77,STERILE: Brand: MEDLINE

## (undated) DEVICE — PENCIL ES L3M BTTN SWCH S STL HEX LOK BLDE ELECTRD HOLSTER

## (undated) DEVICE — TRAY CATH 16FR PVC INTMIT STR TIP PREATTACH TO 1000ML COLL

## (undated) DEVICE — APPLICATOR LAP 35 CM 2 RIGID VISTASEAL

## (undated) DEVICE — TROCAR: Brand: KII FIOS FIRST ENTRY

## (undated) DEVICE — DECANTER FLD 9IN ST BG FOR ASEP TRNSF OF FLD

## (undated) DEVICE — INSUFFLATION NEEDLE TO ESTABLISH PNEUMOPERITONEUM.: Brand: INSUFFLATION NEEDLE

## (undated) DEVICE — SEALANT TISS 4 CC FIBRIN VISTASEAL

## (undated) DEVICE — STERILE POLYISOPRENE POWDER-FREE SURGICAL GLOVES: Brand: PROTEXIS

## (undated) DEVICE — AMD ANTIMICROBIAL NON-ADHERENT PAD,0.2% POLYHEXAMETHYLENE BIGUANIDE HCI (PHMB): Brand: TELFA

## (undated) DEVICE — Z DISCONTINUED USE 2220327 SUTURE ABSORBABLE BRAIDED 0 CT-1 8X27 IN UD VICRYL JJ41G

## (undated) DEVICE — TROCAR: Brand: KII® SLEEVE

## (undated) DEVICE — Device: Brand: PORTEX

## (undated) DEVICE — SOLUTION IRRIG 1000ML STRL H2O USP PLAS POUR BTL

## (undated) DEVICE — SUTURE VICRYL SZ 0 L27IN ABSRB UD L36MM CT-1 1/2 CIR J260H

## (undated) DEVICE — TUBING INSUFFLATION SMK EVAC HI FLO SET PNEUMOCLEAR

## (undated) DEVICE — SURGICAL PROCEDURE PACK C SECT CDS

## (undated) DEVICE — SOLUTION IV 250ML 0.9% SOD CHL PH 5 INJ USP VIAFLX PLAS

## (undated) DEVICE — APPLICATOR MEDICATED 26 CC SOLUTION HI LT ORNG CHLORAPREP

## (undated) DEVICE — GLOVE ORANGE PI 7   MSG9070

## (undated) DEVICE — SUTURE VCRL SZ 0 L36IN ABSRB UD L36MM CT-1 1/2 CIR J946H

## (undated) DEVICE — SURGIFOAM SPNG SZ 100

## (undated) DEVICE — SUTURE MCRYL SZ 3-0 L27IN ABSRB UD L60MM KS STR REV CUT Y523H

## (undated) DEVICE — PAD,NON-ADHERENT,3X8,STERILE,LF,1/PK: Brand: MEDLINE

## (undated) DEVICE — SOLUTION IV 1000ML 0.9% SOD CHL

## (undated) DEVICE — SUTURE VICRYL SZ 4-0 L27IN ABSRB UD L26MM SH 1/2 CIR J415H

## (undated) DEVICE — SUTURE MONOCRYL SZ 4-0 L27IN ABSRB UD L19MM PS-2 1/2 CIR PRIM Y426H

## (undated) DEVICE — SUTURE PLN GUT SZ 2-0 L27IN ABSRB YELLOWISH TAN L40MM CT 853H

## (undated) DEVICE — GLOVE SURG SZ 65 THK91MIL LTX FREE SYN POLYISOPRENE

## (undated) DEVICE — PENCIL ES L3M BTTN SWCH HOLSTER W/ BLDE ELECTRD EDGE

## (undated) DEVICE — CANISTER, RIGID, 2000CC: Brand: MEDLINE INDUSTRIES, INC.

## (undated) DEVICE — YANKAUER,BULB TIP,W/O VENT,RIGID,STERILE: Brand: MEDLINE

## (undated) DEVICE — SYRINGE IRRIG 60ML SFT PLIABLE BLB EZ TO GRP 1 HND USE W/

## (undated) DEVICE — GYN LAPAROSCOPY: Brand: MEDLINE INDUSTRIES, INC.

## (undated) DEVICE — INJECTOR UTERINE MANIPULATOR

## (undated) DEVICE — ELECTRODE NDL 2.8IN COAT VALLEYLAB

## (undated) DEVICE — DECANTER BAG 9": Brand: MEDLINE INDUSTRIES, INC.

## (undated) DEVICE — PAD PT POS 36 IN SURGYPAD DISP